# Patient Record
Sex: FEMALE | Race: WHITE | NOT HISPANIC OR LATINO | URBAN - METROPOLITAN AREA
[De-identification: names, ages, dates, MRNs, and addresses within clinical notes are randomized per-mention and may not be internally consistent; named-entity substitution may affect disease eponyms.]

---

## 2017-01-01 ENCOUNTER — INPATIENT (INPATIENT)
Facility: HOSPITAL | Age: 82
LOS: 2 days | Discharge: NURSING, REHAB OR LONG-TERM CARE | DRG: 682 | End: 2017-01-04
Attending: FAMILY MEDICINE | Admitting: INTERNAL MEDICINE
Payer: MEDICARE

## 2017-01-01 DIAGNOSIS — N17.9 ACUTE KIDNEY FAILURE, UNSPECIFIED: ICD-10-CM

## 2017-01-01 DIAGNOSIS — E86.0 DEHYDRATION: ICD-10-CM

## 2017-01-01 DIAGNOSIS — Z87.891 PERSONAL HISTORY OF NICOTINE DEPENDENCE: ICD-10-CM

## 2017-01-01 DIAGNOSIS — K21.9 GASTRO-ESOPHAGEAL REFLUX DISEASE WITHOUT ESOPHAGITIS: ICD-10-CM

## 2017-01-01 DIAGNOSIS — J15.9 UNSPECIFIED BACTERIAL PNEUMONIA: ICD-10-CM

## 2017-01-01 DIAGNOSIS — Z85.028 PERSONAL HISTORY OF OTHER MALIGNANT NEOPLASM OF STOMACH: ICD-10-CM

## 2017-01-01 DIAGNOSIS — N18.9 CHRONIC KIDNEY DISEASE, UNSPECIFIED: ICD-10-CM

## 2017-01-01 DIAGNOSIS — J18.9 PNEUMONIA, UNSPECIFIED ORGANISM: ICD-10-CM

## 2017-01-01 PROCEDURE — 71020: CPT | Mod: 26

## 2017-01-01 PROCEDURE — 93010 ELECTROCARDIOGRAM REPORT: CPT

## 2017-01-01 PROCEDURE — 99223 1ST HOSP IP/OBS HIGH 75: CPT

## 2017-01-02 PROCEDURE — 99233 SBSQ HOSP IP/OBS HIGH 50: CPT

## 2017-01-03 PROCEDURE — 99232 SBSQ HOSP IP/OBS MODERATE 35: CPT

## 2017-01-04 PROCEDURE — 87040 BLOOD CULTURE FOR BACTERIA: CPT

## 2017-01-04 PROCEDURE — 97001: CPT

## 2017-01-04 PROCEDURE — 94640 AIRWAY INHALATION TREATMENT: CPT

## 2017-01-04 PROCEDURE — 96365 THER/PROPH/DIAG IV INF INIT: CPT

## 2017-01-04 PROCEDURE — 97162 PT EVAL MOD COMPLEX 30 MIN: CPT

## 2017-01-04 PROCEDURE — 99285 EMERGENCY DEPT VISIT HI MDM: CPT | Mod: 25

## 2017-01-04 PROCEDURE — 85027 COMPLETE CBC AUTOMATED: CPT

## 2017-01-04 PROCEDURE — 87798 DETECT AGENT NOS DNA AMP: CPT

## 2017-01-04 PROCEDURE — 96366 THER/PROPH/DIAG IV INF ADDON: CPT

## 2017-01-04 PROCEDURE — 87400 INFLUENZA A/B EACH AG IA: CPT

## 2017-01-04 PROCEDURE — 87633 RESP VIRUS 12-25 TARGETS: CPT

## 2017-01-04 PROCEDURE — 83880 ASSAY OF NATRIURETIC PEPTIDE: CPT

## 2017-01-04 PROCEDURE — 85025 COMPLETE CBC W/AUTO DIFF WBC: CPT

## 2017-01-04 PROCEDURE — 85610 PROTHROMBIN TIME: CPT

## 2017-01-04 PROCEDURE — 93005 ELECTROCARDIOGRAM TRACING: CPT

## 2017-01-04 PROCEDURE — 96367 TX/PROPH/DG ADDL SEQ IV INF: CPT

## 2017-01-04 PROCEDURE — 80053 COMPREHEN METABOLIC PANEL: CPT

## 2017-01-04 PROCEDURE — 80076 HEPATIC FUNCTION PANEL: CPT

## 2017-01-04 PROCEDURE — 83605 ASSAY OF LACTIC ACID: CPT

## 2017-01-04 PROCEDURE — 87581 M.PNEUMON DNA AMP PROBE: CPT

## 2017-01-04 PROCEDURE — 71046 X-RAY EXAM CHEST 2 VIEWS: CPT

## 2017-01-04 PROCEDURE — 83735 ASSAY OF MAGNESIUM: CPT

## 2017-01-04 PROCEDURE — 87486 CHLMYD PNEUM DNA AMP PROBE: CPT

## 2017-01-04 PROCEDURE — 84132 ASSAY OF SERUM POTASSIUM: CPT

## 2017-01-04 PROCEDURE — 80048 BASIC METABOLIC PNL TOTAL CA: CPT

## 2017-01-04 PROCEDURE — 80069 RENAL FUNCTION PANEL: CPT

## 2017-01-04 PROCEDURE — 99232 SBSQ HOSP IP/OBS MODERATE 35: CPT

## 2017-01-04 PROCEDURE — 85730 THROMBOPLASTIN TIME PARTIAL: CPT

## 2017-01-09 ENCOUNTER — APPOINTMENT (OUTPATIENT)
Dept: FAMILY MEDICINE | Facility: CLINIC | Age: 82
End: 2017-01-09

## 2017-03-28 DIAGNOSIS — R93.8 ABNORMAL FINDINGS ON DIAGNOSTIC IMAGING OF OTHER SPECIFIED BODY STRUCTURES: ICD-10-CM

## 2017-03-30 ENCOUNTER — FORM ENCOUNTER (OUTPATIENT)
Age: 82
End: 2017-03-30

## 2017-03-31 ENCOUNTER — OUTPATIENT (OUTPATIENT)
Dept: OUTPATIENT SERVICES | Facility: HOSPITAL | Age: 82
LOS: 1 days | End: 2017-03-31
Payer: MEDICARE

## 2017-03-31 ENCOUNTER — APPOINTMENT (OUTPATIENT)
Dept: CT IMAGING | Facility: HOSPITAL | Age: 82
End: 2017-03-31

## 2017-03-31 DIAGNOSIS — J47.9 BRONCHIECTASIS, UNCOMPLICATED: ICD-10-CM

## 2017-03-31 DIAGNOSIS — J98.11 ATELECTASIS: ICD-10-CM

## 2017-03-31 DIAGNOSIS — J43.9 EMPHYSEMA, UNSPECIFIED: ICD-10-CM

## 2017-03-31 PROCEDURE — 71250 CT THORAX DX C-: CPT

## 2017-04-04 ENCOUNTER — CHART COPY (OUTPATIENT)
Age: 82
End: 2017-04-04

## 2017-04-04 RX ORDER — ACETAMINOPHEN 500 MG/1
500 TABLET, COATED ORAL 3 TIMES DAILY
Qty: 50 | Refills: 3 | Status: ACTIVE | COMMUNITY
Start: 2017-04-04 | End: 1900-01-01

## 2017-04-10 ENCOUNTER — APPOINTMENT (OUTPATIENT)
Dept: FAMILY MEDICINE | Facility: CLINIC | Age: 82
End: 2017-04-10

## 2017-04-10 VITALS
BODY MASS INDEX: 21.14 KG/M2 | HEIGHT: 57 IN | OXYGEN SATURATION: 95 % | RESPIRATION RATE: 15 BRPM | WEIGHT: 98 LBS | DIASTOLIC BLOOD PRESSURE: 72 MMHG | HEART RATE: 73 BPM | SYSTOLIC BLOOD PRESSURE: 100 MMHG

## 2017-04-10 DIAGNOSIS — S09.90XA UNSPECIFIED INJURY OF HEAD, INITIAL ENCOUNTER: ICD-10-CM

## 2017-04-10 DIAGNOSIS — R93.8 ABNORMAL FINDINGS ON DIAGNOSTIC IMAGING OF OTHER SPECIFIED BODY STRUCTURES: ICD-10-CM

## 2017-04-10 RX ORDER — CYANOCOBALAMIN 1000 UG/ML
1000 INJECTION INTRAMUSCULAR; SUBCUTANEOUS
Qty: 0 | Refills: 0 | Status: COMPLETED | OUTPATIENT
Start: 2017-04-10

## 2017-04-10 RX ADMIN — CYANOCOBALAMIN 0 MCG/ML: 1000 INJECTION INTRAMUSCULAR; SUBCUTANEOUS at 00:00

## 2017-05-04 ENCOUNTER — RX RENEWAL (OUTPATIENT)
Age: 82
End: 2017-05-04

## 2017-06-02 ENCOUNTER — MEDICATION RENEWAL (OUTPATIENT)
Age: 82
End: 2017-06-02

## 2017-06-02 ENCOUNTER — RX RENEWAL (OUTPATIENT)
Age: 82
End: 2017-06-02

## 2017-07-10 ENCOUNTER — APPOINTMENT (OUTPATIENT)
Dept: FAMILY MEDICINE | Facility: CLINIC | Age: 82
End: 2017-07-10

## 2017-07-12 VITALS
DIASTOLIC BLOOD PRESSURE: 64 MMHG | HEART RATE: 78 BPM | TEMPERATURE: 98.4 F | RESPIRATION RATE: 20 BRPM | SYSTOLIC BLOOD PRESSURE: 100 MMHG

## 2017-07-13 ENCOUNTER — FORM ENCOUNTER (OUTPATIENT)
Age: 82
End: 2017-07-13

## 2017-07-14 ENCOUNTER — APPOINTMENT (OUTPATIENT)
Dept: CT IMAGING | Facility: HOSPITAL | Age: 82
End: 2017-07-14

## 2017-07-14 ENCOUNTER — OUTPATIENT (OUTPATIENT)
Dept: OUTPATIENT SERVICES | Facility: HOSPITAL | Age: 82
LOS: 1 days | End: 2017-07-14
Payer: MEDICARE

## 2017-07-14 DIAGNOSIS — Z85.028 PERSONAL HISTORY OF OTHER MALIGNANT NEOPLASM OF STOMACH: ICD-10-CM

## 2017-07-14 DIAGNOSIS — N32.89 OTHER SPECIFIED DISORDERS OF BLADDER: ICD-10-CM

## 2017-07-14 PROCEDURE — 74176 CT ABD & PELVIS W/O CONTRAST: CPT

## 2017-07-25 ENCOUNTER — OTHER (OUTPATIENT)
Age: 82
End: 2017-07-25

## 2017-08-06 ENCOUNTER — FORM ENCOUNTER (OUTPATIENT)
Age: 82
End: 2017-08-06

## 2017-08-07 ENCOUNTER — OUTPATIENT (OUTPATIENT)
Dept: OUTPATIENT SERVICES | Facility: HOSPITAL | Age: 82
LOS: 1 days | End: 2017-08-07
Payer: MEDICARE

## 2017-08-07 ENCOUNTER — APPOINTMENT (OUTPATIENT)
Dept: ULTRASOUND IMAGING | Facility: HOSPITAL | Age: 82
End: 2017-08-07
Payer: MEDICARE

## 2017-08-07 DIAGNOSIS — Z85.028 PERSONAL HISTORY OF OTHER MALIGNANT NEOPLASM OF STOMACH: ICD-10-CM

## 2017-08-07 DIAGNOSIS — K76.0 FATTY (CHANGE OF) LIVER, NOT ELSEWHERE CLASSIFIED: ICD-10-CM

## 2017-08-07 DIAGNOSIS — K83.8 OTHER SPECIFIED DISEASES OF BILIARY TRACT: ICD-10-CM

## 2017-08-07 DIAGNOSIS — Z90.49 ACQUIRED ABSENCE OF OTHER SPECIFIED PARTS OF DIGESTIVE TRACT: ICD-10-CM

## 2017-08-07 PROCEDURE — 76700 US EXAM ABDOM COMPLETE: CPT | Mod: 26

## 2017-08-07 PROCEDURE — 76700 US EXAM ABDOM COMPLETE: CPT

## 2017-10-09 ENCOUNTER — APPOINTMENT (OUTPATIENT)
Dept: FAMILY MEDICINE | Facility: CLINIC | Age: 82
End: 2017-10-09

## 2017-10-09 ENCOUNTER — INPATIENT (INPATIENT)
Facility: HOSPITAL | Age: 82
LOS: 3 days | Discharge: HOME | DRG: 153 | End: 2017-10-13
Attending: HOSPITALIST | Admitting: FAMILY MEDICINE
Payer: MEDICARE

## 2017-10-09 DIAGNOSIS — Z85.028 PERSONAL HISTORY OF OTHER MALIGNANT NEOPLASM OF STOMACH: ICD-10-CM

## 2017-10-09 DIAGNOSIS — K21.9 GASTRO-ESOPHAGEAL REFLUX DISEASE WITHOUT ESOPHAGITIS: ICD-10-CM

## 2017-10-09 DIAGNOSIS — E87.0 HYPEROSMOLALITY AND HYPERNATREMIA: ICD-10-CM

## 2017-10-09 DIAGNOSIS — N18.3 CHRONIC KIDNEY DISEASE, STAGE 3 (MODERATE): ICD-10-CM

## 2017-10-09 DIAGNOSIS — G62.9 POLYNEUROPATHY, UNSPECIFIED: ICD-10-CM

## 2017-10-09 DIAGNOSIS — D69.6 THROMBOCYTOPENIA, UNSPECIFIED: ICD-10-CM

## 2017-10-09 DIAGNOSIS — G47.00 INSOMNIA, UNSPECIFIED: ICD-10-CM

## 2017-10-09 DIAGNOSIS — N17.9 ACUTE KIDNEY FAILURE, UNSPECIFIED: ICD-10-CM

## 2017-10-09 DIAGNOSIS — J06.9 ACUTE UPPER RESPIRATORY INFECTION, UNSPECIFIED: ICD-10-CM

## 2017-10-09 DIAGNOSIS — R50.9 FEVER, UNSPECIFIED: ICD-10-CM

## 2017-10-09 PROCEDURE — 71010: CPT | Mod: 26

## 2017-10-09 PROCEDURE — 93010 ELECTROCARDIOGRAM REPORT: CPT

## 2017-10-09 PROCEDURE — 99223 1ST HOSP IP/OBS HIGH 75: CPT | Mod: AI

## 2017-10-10 PROCEDURE — 99233 SBSQ HOSP IP/OBS HIGH 50: CPT

## 2017-10-11 PROCEDURE — 99233 SBSQ HOSP IP/OBS HIGH 50: CPT

## 2017-10-12 PROCEDURE — 99233 SBSQ HOSP IP/OBS HIGH 50: CPT

## 2017-10-13 ENCOUNTER — MEDICATION RENEWAL (OUTPATIENT)
Age: 82
End: 2017-10-13

## 2017-10-13 PROCEDURE — 99233 SBSQ HOSP IP/OBS HIGH 50: CPT

## 2017-11-10 ENCOUNTER — APPOINTMENT (OUTPATIENT)
Dept: FAMILY MEDICINE | Facility: CLINIC | Age: 82
End: 2017-11-10
Payer: MEDICARE

## 2017-11-10 VITALS — RESPIRATION RATE: 16 BRPM | OXYGEN SATURATION: 96 %

## 2017-11-10 VITALS
DIASTOLIC BLOOD PRESSURE: 80 MMHG | HEART RATE: 74 BPM | SYSTOLIC BLOOD PRESSURE: 112 MMHG | HEIGHT: 57 IN | WEIGHT: 107 LBS | OXYGEN SATURATION: 95 % | BODY MASS INDEX: 23.08 KG/M2

## 2017-11-10 DIAGNOSIS — G25.81 RESTLESS LEGS SYNDROME: ICD-10-CM

## 2017-11-10 PROCEDURE — 99214 OFFICE O/P EST MOD 30 MIN: CPT

## 2017-12-10 ENCOUNTER — INPATIENT (INPATIENT)
Facility: HOSPITAL | Age: 82
LOS: 0 days | Discharge: HOME | DRG: 563 | End: 2017-12-11
Attending: HOSPITALIST | Admitting: INTERNAL MEDICINE
Payer: COMMERCIAL

## 2017-12-10 DIAGNOSIS — N18.4 CHRONIC KIDNEY DISEASE, STAGE 4 (SEVERE): ICD-10-CM
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
## 2017-12-10 DIAGNOSIS — L03.90 CELLULITIS, UNSPECIFIED: ICD-10-CM

## 2017-12-10 DIAGNOSIS — S42.001A FRACTURE OF UNSPECIFIED PART OF RIGHT CLAVICLE, INITIAL ENCOUNTER FOR CLOSED FRACTURE: ICD-10-CM
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
## 2017-12-10 DIAGNOSIS — K21.9 GASTRO-ESOPHAGEAL REFLUX DISEASE WITHOUT ESOPHAGITIS: ICD-10-CM
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
## 2017-12-10 DIAGNOSIS — G25.81 RESTLESS LEGS SYNDROME: ICD-10-CM

## 2017-12-10 DIAGNOSIS — D69.6 THROMBOCYTOPENIA, UNSPECIFIED: ICD-10-CM

## 2017-12-10 DIAGNOSIS — Z85.00 PERSONAL HISTORY OF MALIGNANT NEOPLASM OF UNSPECIFIED DIGESTIVE ORGAN: ICD-10-CM

## 2017-12-10 PROCEDURE — 93010 ELECTROCARDIOGRAM REPORT: CPT

## 2017-12-10 PROCEDURE — 73030 X-RAY EXAM OF SHOULDER: CPT | Mod: 26,RT

## 2017-12-10 PROCEDURE — 71010: CPT | Mod: 26

## 2017-12-11 PROCEDURE — 99239 HOSP IP/OBS DSCHRG MGMT >30: CPT

## 2017-12-15 ENCOUNTER — APPOINTMENT (OUTPATIENT)
Dept: FAMILY MEDICINE | Facility: CLINIC | Age: 82
End: 2017-12-15
Payer: MEDICARE

## 2017-12-15 VITALS
HEIGHT: 57 IN | BODY MASS INDEX: 23.08 KG/M2 | DIASTOLIC BLOOD PRESSURE: 78 MMHG | HEART RATE: 81 BPM | WEIGHT: 107 LBS | OXYGEN SATURATION: 86 % | SYSTOLIC BLOOD PRESSURE: 120 MMHG

## 2017-12-15 VITALS — RESPIRATION RATE: 14 BRPM

## 2017-12-15 PROCEDURE — 99214 OFFICE O/P EST MOD 30 MIN: CPT

## 2017-12-19 PROCEDURE — 80076 HEPATIC FUNCTION PANEL: CPT

## 2017-12-19 PROCEDURE — 87581 M.PNEUMON DNA AMP PROBE: CPT

## 2017-12-19 PROCEDURE — 85610 PROTHROMBIN TIME: CPT

## 2017-12-19 PROCEDURE — 87086 URINE CULTURE/COLONY COUNT: CPT

## 2017-12-19 PROCEDURE — 83605 ASSAY OF LACTIC ACID: CPT

## 2017-12-19 PROCEDURE — 71045 X-RAY EXAM CHEST 1 VIEW: CPT

## 2017-12-19 PROCEDURE — 97161 PT EVAL LOW COMPLEX 20 MIN: CPT

## 2017-12-19 PROCEDURE — 85730 THROMBOPLASTIN TIME PARTIAL: CPT

## 2017-12-19 PROCEDURE — 87633 RESP VIRUS 12-25 TARGETS: CPT

## 2017-12-19 PROCEDURE — G8980: CPT

## 2017-12-19 PROCEDURE — 96365 THER/PROPH/DIAG IV INF INIT: CPT

## 2017-12-19 PROCEDURE — 80053 COMPREHEN METABOLIC PANEL: CPT

## 2017-12-19 PROCEDURE — G8978: CPT

## 2017-12-19 PROCEDURE — 97116 GAIT TRAINING THERAPY: CPT

## 2017-12-19 PROCEDURE — 85025 COMPLETE CBC W/AUTO DIFF WBC: CPT

## 2017-12-19 PROCEDURE — 87040 BLOOD CULTURE FOR BACTERIA: CPT

## 2017-12-19 PROCEDURE — G8979: CPT

## 2017-12-19 PROCEDURE — 96366 THER/PROPH/DIAG IV INF ADDON: CPT

## 2017-12-19 PROCEDURE — 85027 COMPLETE CBC AUTOMATED: CPT

## 2017-12-19 PROCEDURE — 80048 BASIC METABOLIC PNL TOTAL CA: CPT

## 2017-12-19 PROCEDURE — 93005 ELECTROCARDIOGRAM TRACING: CPT

## 2017-12-19 PROCEDURE — 81003 URINALYSIS AUTO W/O SCOPE: CPT

## 2017-12-19 PROCEDURE — 73030 X-RAY EXAM OF SHOULDER: CPT

## 2017-12-19 PROCEDURE — 87486 CHLMYD PNEUM DNA AMP PROBE: CPT

## 2017-12-19 PROCEDURE — 99285 EMERGENCY DEPT VISIT HI MDM: CPT | Mod: 25

## 2017-12-19 PROCEDURE — 96367 TX/PROPH/DG ADDL SEQ IV INF: CPT

## 2017-12-19 PROCEDURE — 87400 INFLUENZA A/B EACH AG IA: CPT

## 2017-12-19 PROCEDURE — 96375 TX/PRO/DX INJ NEW DRUG ADDON: CPT

## 2017-12-19 PROCEDURE — 87798 DETECT AGENT NOS DNA AMP: CPT

## 2017-12-29 ENCOUNTER — APPOINTMENT (OUTPATIENT)
Dept: FAMILY MEDICINE | Facility: CLINIC | Age: 82
End: 2017-12-29
Payer: MEDICARE

## 2017-12-29 VITALS
TEMPERATURE: 98.4 F | RESPIRATION RATE: 14 BRPM | OXYGEN SATURATION: 97 % | BODY MASS INDEX: 23.08 KG/M2 | WEIGHT: 107 LBS | HEART RATE: 80 BPM | SYSTOLIC BLOOD PRESSURE: 128 MMHG | DIASTOLIC BLOOD PRESSURE: 72 MMHG | HEIGHT: 57 IN

## 2017-12-29 DIAGNOSIS — S81.819A LACERATION W/OUT FOREIGN BODY, UNSPECIFIED LOWER LEG, INITIAL ENCOUNTER: ICD-10-CM

## 2017-12-29 DIAGNOSIS — S42.021A DISPLACED FRACTURE OF SHAFT OF RIGHT CLAVICLE, INITIAL ENCOUNTER FOR CLOSED FRACTURE: ICD-10-CM

## 2017-12-29 PROCEDURE — 99214 OFFICE O/P EST MOD 30 MIN: CPT

## 2018-01-26 ENCOUNTER — APPOINTMENT (OUTPATIENT)
Dept: FAMILY MEDICINE | Facility: CLINIC | Age: 83
End: 2018-01-26

## 2018-05-07 ENCOUNTER — RX RENEWAL (OUTPATIENT)
Age: 83
End: 2018-05-07

## 2018-05-07 ENCOUNTER — APPOINTMENT (OUTPATIENT)
Dept: FAMILY MEDICINE | Facility: CLINIC | Age: 83
End: 2018-05-07
Payer: MEDICARE

## 2018-05-07 VITALS
HEART RATE: 67 BPM | OXYGEN SATURATION: 95 % | DIASTOLIC BLOOD PRESSURE: 80 MMHG | BODY MASS INDEX: 23.3 KG/M2 | HEIGHT: 57 IN | TEMPERATURE: 97.9 F | WEIGHT: 108 LBS | SYSTOLIC BLOOD PRESSURE: 110 MMHG

## 2018-05-07 VITALS — RESPIRATION RATE: 14 BRPM

## 2018-05-07 DIAGNOSIS — Z86.2 PERSONAL HISTORY OF DISEASES OF THE BLOOD AND BLOOD-FORMING ORGANS AND CERTAIN DISORDERS INVOLVING THE IMMUNE MECHANISM: ICD-10-CM

## 2018-05-07 PROCEDURE — 99213 OFFICE O/P EST LOW 20 MIN: CPT | Mod: 25

## 2018-05-07 PROCEDURE — 36415 COLL VENOUS BLD VENIPUNCTURE: CPT

## 2018-05-08 LAB
ALBUMIN SERPL ELPH-MCNC: 4.2 G/DL
ALP BLD-CCNC: 118 U/L
ALT SERPL-CCNC: 29 U/L
ANION GAP SERPL CALC-SCNC: 13 MMOL/L
AST SERPL-CCNC: 43 U/L
BASOPHILS # BLD AUTO: 0.02 K/UL
BASOPHILS NFR BLD AUTO: 0.4 %
BILIRUB SERPL-MCNC: 0.4 MG/DL
BUN SERPL-MCNC: 42 MG/DL
CALCIUM SERPL-MCNC: 9.3 MG/DL
CHLORIDE SERPL-SCNC: 110 MMOL/L
CHOLEST SERPL-MCNC: 195 MG/DL
CHOLEST/HDLC SERPL: 2.9 RATIO
CO2 SERPL-SCNC: 22 MMOL/L
CREAT SERPL-MCNC: 1.98 MG/DL
EOSINOPHIL # BLD AUTO: 0.11 K/UL
EOSINOPHIL NFR BLD AUTO: 2.2 %
FERRITIN SERPL-MCNC: 45 NG/ML
FOLATE SERPL-MCNC: 11.5 NG/ML
GLUCOSE SERPL-MCNC: 88 MG/DL
HBA1C MFR BLD HPLC: 5.8 %
HCT VFR BLD CALC: 40.4 %
HDLC SERPL-MCNC: 68 MG/DL
HGB BLD-MCNC: 12.2 G/DL
IMM GRANULOCYTES NFR BLD AUTO: 0 %
IRON SATN MFR SERPL: 19 %
IRON SERPL-MCNC: 59 UG/DL
LDLC SERPL CALC-MCNC: 109 MG/DL
LYMPHOCYTES # BLD AUTO: 1.11 K/UL
LYMPHOCYTES NFR BLD AUTO: 22.2 %
MAN DIFF?: NORMAL
MCHC RBC-ENTMCNC: 30.2 GM/DL
MCHC RBC-ENTMCNC: 30.3 PG
MCV RBC AUTO: 100.5 FL
MONOCYTES # BLD AUTO: 0.4 K/UL
MONOCYTES NFR BLD AUTO: 8 %
NEUTROPHILS # BLD AUTO: 3.35 K/UL
NEUTROPHILS NFR BLD AUTO: 67.2 %
PLATELET # BLD AUTO: 168 K/UL
POTASSIUM SERPL-SCNC: 5 MMOL/L
PROT SERPL-MCNC: 7.8 G/DL
RBC # BLD: 4.02 M/UL
RBC # FLD: 14.6 %
SODIUM SERPL-SCNC: 145 MMOL/L
T4 FREE SERPL-MCNC: 1.2 NG/DL
TIBC SERPL-MCNC: 310 UG/DL
TRIGL SERPL-MCNC: 90 MG/DL
TSH SERPL-ACNC: 3.4 UIU/ML
UIBC SERPL-MCNC: 251 UG/DL
VIT B12 SERPL-MCNC: 696 PG/ML
WBC # FLD AUTO: 4.99 K/UL

## 2018-06-08 ENCOUNTER — RX RENEWAL (OUTPATIENT)
Age: 83
End: 2018-06-08

## 2018-06-08 ENCOUNTER — MEDICATION RENEWAL (OUTPATIENT)
Age: 83
End: 2018-06-08

## 2018-08-06 ENCOUNTER — APPOINTMENT (OUTPATIENT)
Dept: FAMILY MEDICINE | Facility: CLINIC | Age: 83
End: 2018-08-06
Payer: MEDICARE

## 2018-08-06 VITALS
TEMPERATURE: 97.8 F | HEIGHT: 57 IN | BODY MASS INDEX: 22.87 KG/M2 | SYSTOLIC BLOOD PRESSURE: 120 MMHG | OXYGEN SATURATION: 96 % | HEART RATE: 69 BPM | WEIGHT: 106 LBS | DIASTOLIC BLOOD PRESSURE: 80 MMHG

## 2018-08-06 VITALS — RESPIRATION RATE: 14 BRPM

## 2018-08-06 DIAGNOSIS — E53.8 DEFICIENCY OF OTHER SPECIFIED B GROUP VITAMINS: ICD-10-CM

## 2018-08-06 PROCEDURE — 99213 OFFICE O/P EST LOW 20 MIN: CPT

## 2018-08-06 NOTE — PHYSICAL EXAM
[No Acute Distress] : no acute distress [Well Nourished] : well nourished [Well Developed] : well developed [Well-Appearing] : well-appearing [Normal Sclera/Conjunctiva] : normal sclera/conjunctiva [PERRL] : pupils equal round and reactive to light [EOMI] : extraocular movements intact [Normal Outer Ear/Nose] : the outer ears and nose were normal in appearance [Normal Oropharynx] : the oropharynx was normal [No JVD] : no jugular venous distention [Supple] : supple [No Lymphadenopathy] : no lymphadenopathy [Thyroid Normal, No Nodules] : the thyroid was normal and there were no nodules present [No Respiratory Distress] : no respiratory distress  [Clear to Auscultation] : lungs were clear to auscultation bilaterally [No Accessory Muscle Use] : no accessory muscle use [Normal Rate] : normal rate  [Regular Rhythm] : with a regular rhythm [Normal S1, S2] : normal S1 and S2 [No Carotid Bruits] : no carotid bruits [No Abdominal Bruit] : a ~M bruit was not heard ~T in the abdomen [No Varicosities] : no varicosities [Pedal Pulses Present] : the pedal pulses are present [No Edema] : there was no peripheral edema [No Extremity Clubbing/Cyanosis] : no extremity clubbing/cyanosis [No Palpable Aorta] : no palpable aorta [Soft] : abdomen soft [Non Tender] : non-tender [Non-distended] : non-distended [No Masses] : no abdominal mass palpated [No HSM] : no HSM [Normal Bowel Sounds] : normal bowel sounds [Normal Posterior Cervical Nodes] : no posterior cervical lymphadenopathy [Normal Anterior Cervical Nodes] : no anterior cervical lymphadenopathy [No CVA Tenderness] : no CVA  tenderness [No Spinal Tenderness] : no spinal tenderness [No Joint Swelling] : no joint swelling [No Rash] : no rash [Coordination Grossly Intact] : coordination grossly intact [No Focal Deficits] : no focal deficits [Deep Tendon Reflexes (DTR)] : deep tendon reflexes were 2+ and symmetric [Speech Grossly Normal] : speech grossly normal [Memory Grossly Normal] : memory grossly normal [Normal Affect] : the affect was normal [Alert and Oriented x3] : oriented to person, place, and time [Normal Mood] : the mood was normal [Normal Insight/Judgement] : insight and judgment were intact [de-identified] : Systolic murmur 3/6 [de-identified] : Status post laceration and skin care. Right lower extremity healed well [de-identified] : Gait unsteady, but patient does well with cane

## 2018-08-06 NOTE — HISTORY OF PRESENT ILLNESS
[FreeTextEntry1] : Please see history of present illness [de-identified] : Patient is a 89-year-old female, who presents today for followup appointment. Patient states that she is in her usual state of health. She denies any chest pain, shortness of breath, nausea, or vomiting. In fact, she states she is feeling good.\par \par Medical history is significant for anemia, chronic renal insufficiency, hypertension, osteoarthritis, and history of vitamin B12 deficiency.\par \par Patient is awake, alert, and oriented x3 in no acute distress. She is calm, cooperative, well-groomed, and nourished

## 2018-08-06 NOTE — ASSESSMENT
[FreeTextEntry1] : We will continue present medical management without change. Patient is doing well. She prefers conservative management .Patient's comprehensive physical exam was stable. No acute changes as stated earlier. Continue present medical management without change

## 2018-08-06 NOTE — HEALTH RISK ASSESSMENT
[Patient declined mammogram] : Patient declined mammogram [Patient declined PAP Smear] : Patient declined PAP Smear [Patient declined bone density test] : Patient declined bone density test [Patient declined colonoscopy] : Patient declined colonoscopy [HIV test declined] : HIV test declined [Hepatitis C test offered] : Hepatitis C test offered [Fully functional (bathing, dressing, toileting, transferring, walking, feeding)] : Fully functional (bathing, dressing, toileting, transferring, walking, feeding) [Fully functional (using the telephone, shopping, preparing meals, housekeeping, doing laundry, using] : Fully functional and needs no help or supervision to perform IADLs (using the telephone, shopping, preparing meals, housekeeping, doing laundry, using transportation, managing medications and managing finances) [Discussed at today's visit] : Advance Directives Discussed at today's visit [Patient's preferences updated] : Patient's preferences updated  [Designated Healthcare Proxy] : Designated healthcare proxy [Name: ___] : Health Care Proxy's Name: [unfilled]  [Relationship: ___] : Relationship: [unfilled] [Aggressive treatment] : aggressive treatment [] : No [Any fall with injury in past year] : Patient reported fall with injury in the past year [0] : 2) Feeling down, depressed, or hopeless: Not at all (0) [[id number]] : 0

## 2018-08-06 NOTE — REVIEW OF SYSTEMS
[Joint Pain] : joint pain [Joint Stiffness] : no joint stiffness [Joint Swelling] : no joint swelling [Muscle Weakness] : no muscle weakness [Muscle Pain] : no muscle pain [Back Pain] : no back pain [Negative] : Heme/Lymph

## 2018-10-05 ENCOUNTER — MEDICATION RENEWAL (OUTPATIENT)
Age: 83
End: 2018-10-05

## 2018-10-05 ENCOUNTER — RX RENEWAL (OUTPATIENT)
Age: 83
End: 2018-10-05

## 2018-10-05 RX ORDER — PANTOPRAZOLE 40 MG/1
40 TABLET, DELAYED RELEASE ORAL
Qty: 30 | Refills: 3 | Status: ACTIVE | COMMUNITY
Start: 2018-10-05 | End: 1900-01-01

## 2018-11-02 ENCOUNTER — APPOINTMENT (OUTPATIENT)
Dept: FAMILY MEDICINE | Facility: CLINIC | Age: 83
End: 2018-11-02
Payer: MEDICARE

## 2018-11-02 VITALS
OXYGEN SATURATION: 78 % | BODY MASS INDEX: 22.87 KG/M2 | DIASTOLIC BLOOD PRESSURE: 80 MMHG | TEMPERATURE: 98 F | SYSTOLIC BLOOD PRESSURE: 140 MMHG | HEART RATE: 109 BPM | HEIGHT: 57 IN | WEIGHT: 106 LBS

## 2018-11-02 VITALS — RESPIRATION RATE: 14 BRPM

## 2018-11-02 DIAGNOSIS — C16.9 MALIGNANT NEOPLASM OF STOMACH, UNSPECIFIED: ICD-10-CM

## 2018-11-02 DIAGNOSIS — M19.90 UNSPECIFIED OSTEOARTHRITIS, UNSPECIFIED SITE: ICD-10-CM

## 2018-11-02 DIAGNOSIS — I10 ESSENTIAL (PRIMARY) HYPERTENSION: ICD-10-CM

## 2018-11-02 DIAGNOSIS — K21.9 GASTRO-ESOPHAGEAL REFLUX DISEASE W/OUT ESOPHAGITIS: ICD-10-CM

## 2018-11-02 PROCEDURE — 99213 OFFICE O/P EST LOW 20 MIN: CPT | Mod: 25

## 2018-11-02 PROCEDURE — G0008: CPT

## 2018-11-02 PROCEDURE — 90686 IIV4 VACC NO PRSV 0.5 ML IM: CPT

## 2018-11-02 NOTE — HISTORY OF PRESENT ILLNESS
[FreeTextEntry1] : Please see history of present illness [de-identified] : Patient is a 89-year-old female, who presents today for followup appointment. Patient states that she is in a usual state of health without complaints. Medical history is significant for gastroesophageal reflux, hypertension, history of malignant neoplasm of stomach patient is in remission and doing well. She does have a history of osteoarthritis, which is well-controlled. Patient denies any chest pain, shortness of breath, nausea, or vomiting. States, that she's feeling well.

## 2018-11-02 NOTE — REVIEW OF SYSTEMS
[Joint Pain] : joint pain [Joint Stiffness] : no joint stiffness [Joint Swelling] : no joint swelling [Muscle Weakness] : no muscle weakness [Muscle Pain] : no muscle pain [Back Pain] : no back pain [Unsteady Walking] : ataxia [Negative] : Heme/Lymph [FreeTextEntry9] : Joint pain is a chronic problem, but well controlled [de-identified] : Patient ambulates with cane and does well

## 2018-11-02 NOTE — PHYSICAL EXAM
[No Acute Distress] : no acute distress [Well Nourished] : well nourished [Well Developed] : well developed [Well-Appearing] : well-appearing [Normal Voice/Communication] : normal voice/communication [Normal Sclera/Conjunctiva] : normal sclera/conjunctiva [PERRL] : pupils equal round and reactive to light [EOMI] : extraocular movements intact [Normal Outer Ear/Nose] : the outer ears and nose were normal in appearance [Normal Oropharynx] : the oropharynx was normal [Normal TMs] : both tympanic membranes were normal [No JVD] : no jugular venous distention [Supple] : supple [No Lymphadenopathy] : no lymphadenopathy [Thyroid Normal, No Nodules] : the thyroid was normal and there were no nodules present [No Respiratory Distress] : no respiratory distress  [Clear to Auscultation] : lungs were clear to auscultation bilaterally [No Accessory Muscle Use] : no accessory muscle use [Normal Rate] : normal rate  [Regular Rhythm] : with a regular rhythm [Normal S1, S2] : normal S1 and S2 [No Carotid Bruits] : no carotid bruits [No Abdominal Bruit] : a ~M bruit was not heard ~T in the abdomen [No Varicosities] : no varicosities [Pedal Pulses Present] : the pedal pulses are present [No Edema] : there was no peripheral edema [No Extremity Clubbing/Cyanosis] : no extremity clubbing/cyanosis [No Palpable Aorta] : no palpable aorta [Soft] : abdomen soft [Non Tender] : non-tender [Non-distended] : non-distended [No Masses] : no abdominal mass palpated [No HSM] : no HSM [Normal Bowel Sounds] : normal bowel sounds [Normal Posterior Cervical Nodes] : no posterior cervical lymphadenopathy [Normal Anterior Cervical Nodes] : no anterior cervical lymphadenopathy [No CVA Tenderness] : no CVA  tenderness [No Spinal Tenderness] : no spinal tenderness [No Joint Swelling] : no joint swelling [No Rash] : no rash [Coordination Grossly Intact] : coordination grossly intact [No Focal Deficits] : no focal deficits [Deep Tendon Reflexes (DTR)] : deep tendon reflexes were 2+ and symmetric [Speech Grossly Normal] : speech grossly normal [Memory Grossly Normal] : memory grossly normal [Normal Affect] : the affect was normal [Alert and Oriented x3] : oriented to person, place, and time [Normal Mood] : the mood was normal [Normal Insight/Judgement] : insight and judgment were intact [de-identified] : Systolic murmur 3/6 no change stable [de-identified] : Status post laceration and skin care. Right lower extremity healed well [de-identified] : Gait unsteady, but patient does well with cane

## 2018-11-02 NOTE — ASSESSMENT
[FreeTextEntry1] : Assessment and plan:\par \par 1. Gastroesophageal reflux disease with history of stomach CA. Patient doing well tolerating proton pump inhibitor. No change in medications.\par \par 2. Anemia, stable. No change in present medical management. Patient has not required B12 injections for a while. Patient taking B12 supplementation orally at next visit. I will obtain comprehensive blood work.\par \par 3. Unsteady gait stable. Patient has had no falls. Ambulates well with cane.\par \par 4. Influenza vaccine administered.

## 2018-11-02 NOTE — HEALTH RISK ASSESSMENT
[] : No [No falls in past year] : Patient reported no falls in the past year [0] : 2) Feeling down, depressed, or hopeless: Not at all (0) [[id number]] : 0

## 2018-12-04 ENCOUNTER — RX RENEWAL (OUTPATIENT)
Age: 83
End: 2018-12-04

## 2018-12-04 RX ORDER — PRAMIPEXOLE DIHYDROCHLORIDE 0.25 MG/1
0.25 TABLET ORAL
Qty: 30 | Refills: 3 | Status: ACTIVE | COMMUNITY
Start: 2018-06-08 | End: 1900-01-01

## 2019-01-25 ENCOUNTER — INPATIENT (INPATIENT)
Facility: HOSPITAL | Age: 84
LOS: 2 days | Discharge: HOME | DRG: 308 | End: 2019-01-28
Attending: INTERNAL MEDICINE | Admitting: INTERNAL MEDICINE
Payer: MEDICARE

## 2019-01-25 ENCOUNTER — APPOINTMENT (OUTPATIENT)
Dept: FAMILY MEDICINE | Facility: CLINIC | Age: 84
End: 2019-01-25
Payer: MEDICARE

## 2019-01-25 VITALS
OXYGEN SATURATION: 99 % | WEIGHT: 120 LBS | HEIGHT: 57 IN | BODY MASS INDEX: 25.89 KG/M2 | RESPIRATION RATE: 18 BRPM | HEART RATE: 147 BPM | SYSTOLIC BLOOD PRESSURE: 108 MMHG | TEMPERATURE: 97.5 F | DIASTOLIC BLOOD PRESSURE: 80 MMHG

## 2019-01-25 VITALS — HEART RATE: 119 BPM

## 2019-01-25 VITALS
HEART RATE: 86 BPM | DIASTOLIC BLOOD PRESSURE: 89 MMHG | HEIGHT: 62 IN | SYSTOLIC BLOOD PRESSURE: 129 MMHG | OXYGEN SATURATION: 98 % | RESPIRATION RATE: 18 BRPM | WEIGHT: 121.92 LBS | TEMPERATURE: 98 F

## 2019-01-25 DIAGNOSIS — I48.91 UNSPECIFIED ATRIAL FIBRILLATION: ICD-10-CM

## 2019-01-25 DIAGNOSIS — I50.9 HEART FAILURE, UNSPECIFIED: ICD-10-CM

## 2019-01-25 DIAGNOSIS — N18.9 CHRONIC KIDNEY DISEASE, UNSPECIFIED: ICD-10-CM

## 2019-01-25 LAB
ALBUMIN SERPL ELPH-MCNC: 2.8 G/DL — LOW (ref 3.3–5)
ALP SERPL-CCNC: 149 U/L — HIGH (ref 40–120)
ALT FLD-CCNC: 42 U/L DA — SIGNIFICANT CHANGE UP (ref 10–45)
ANION GAP SERPL CALC-SCNC: 10 MMOL/L — SIGNIFICANT CHANGE UP (ref 5–17)
APTT BLD: 28.4 SEC — SIGNIFICANT CHANGE UP (ref 27.5–36.3)
AST SERPL-CCNC: 61 U/L — HIGH (ref 10–40)
BILIRUB SERPL-MCNC: 0.5 MG/DL — SIGNIFICANT CHANGE UP (ref 0.2–1.2)
BUN SERPL-MCNC: 54 MG/DL — HIGH (ref 7–23)
CALCIUM SERPL-MCNC: 8.6 MG/DL — SIGNIFICANT CHANGE UP (ref 8.4–10.5)
CHLORIDE SERPL-SCNC: 112 MMOL/L — HIGH (ref 96–108)
CO2 SERPL-SCNC: 20 MMOL/L — LOW (ref 22–31)
CREAT SERPL-MCNC: 2.69 MG/DL — HIGH (ref 0.5–1.3)
GLUCOSE SERPL-MCNC: 94 MG/DL — SIGNIFICANT CHANGE UP (ref 70–99)
HCT VFR BLD CALC: 31 % — LOW (ref 34.5–45)
HGB BLD-MCNC: 9.2 G/DL — LOW (ref 11.5–15.5)
INR BLD: 1.05 RATIO — SIGNIFICANT CHANGE UP (ref 0.88–1.16)
LACTATE SERPL-SCNC: 1 MMOL/L — SIGNIFICANT CHANGE UP (ref 0.7–2)
MCHC RBC-ENTMCNC: 28.1 PG — SIGNIFICANT CHANGE UP (ref 27–34)
MCHC RBC-ENTMCNC: 29.7 GM/DL — LOW (ref 32–36)
MCV RBC AUTO: 94.6 FL — SIGNIFICANT CHANGE UP (ref 80–100)
NT-PROBNP SERPL-SCNC: HIGH PG/ML (ref 0–300)
PLATELET # BLD AUTO: 173 K/UL — SIGNIFICANT CHANGE UP (ref 150–400)
POTASSIUM SERPL-MCNC: 5.1 MMOL/L — SIGNIFICANT CHANGE UP (ref 3.5–5.3)
POTASSIUM SERPL-MCNC: 5.5 MMOL/L — HIGH (ref 3.5–5.3)
POTASSIUM SERPL-SCNC: 5.1 MMOL/L — SIGNIFICANT CHANGE UP (ref 3.5–5.3)
POTASSIUM SERPL-SCNC: 5.5 MMOL/L — HIGH (ref 3.5–5.3)
PROT SERPL-MCNC: 7.2 G/DL — SIGNIFICANT CHANGE UP (ref 6–8.3)
PROTHROM AB SERPL-ACNC: 11.8 SEC — SIGNIFICANT CHANGE UP (ref 10–12.9)
RBC # BLD: 3.28 M/UL — LOW (ref 3.8–5.2)
RBC # FLD: 16.2 % — HIGH (ref 10.3–14.5)
SODIUM SERPL-SCNC: 142 MMOL/L — SIGNIFICANT CHANGE UP (ref 135–145)
TROPONIN I SERPL-MCNC: 0.02 NG/ML — SIGNIFICANT CHANGE UP (ref 0.02–0.06)
WBC # BLD: 5.7 K/UL — SIGNIFICANT CHANGE UP (ref 3.8–10.5)
WBC # FLD AUTO: 5.7 K/UL — SIGNIFICANT CHANGE UP (ref 3.8–10.5)

## 2019-01-25 PROCEDURE — 93970 EXTREMITY STUDY: CPT | Mod: 26

## 2019-01-25 PROCEDURE — 71045 X-RAY EXAM CHEST 1 VIEW: CPT | Mod: 26

## 2019-01-25 PROCEDURE — 99223 1ST HOSP IP/OBS HIGH 75: CPT

## 2019-01-25 PROCEDURE — 99215 OFFICE O/P EST HI 40 MIN: CPT | Mod: 25

## 2019-01-25 PROCEDURE — 99285 EMERGENCY DEPT VISIT HI MDM: CPT

## 2019-01-25 PROCEDURE — 93010 ELECTROCARDIOGRAM REPORT: CPT

## 2019-01-25 PROCEDURE — 93000 ELECTROCARDIOGRAM COMPLETE: CPT | Mod: 59

## 2019-01-25 RX ORDER — ASPIRIN/CALCIUM CARB/MAGNESIUM 324 MG
162 TABLET ORAL ONCE
Qty: 0 | Refills: 0 | Status: DISCONTINUED | OUTPATIENT
Start: 2019-01-25 | End: 2019-01-25

## 2019-01-25 RX ORDER — AZITHROMYCIN 500 MG/1
500 TABLET, FILM COATED ORAL ONCE
Qty: 0 | Refills: 0 | Status: COMPLETED | OUTPATIENT
Start: 2019-01-25 | End: 2019-01-25

## 2019-01-25 RX ORDER — CEFTRIAXONE 500 MG/1
1 INJECTION, POWDER, FOR SOLUTION INTRAMUSCULAR; INTRAVENOUS ONCE
Qty: 0 | Refills: 0 | Status: COMPLETED | OUTPATIENT
Start: 2019-01-25 | End: 2019-01-25

## 2019-01-25 RX ORDER — FUROSEMIDE 40 MG
40 TABLET ORAL DAILY
Qty: 0 | Refills: 0 | Status: DISCONTINUED | OUTPATIENT
Start: 2019-01-25 | End: 2019-01-26

## 2019-01-25 RX ORDER — PANTOPRAZOLE SODIUM 20 MG/1
40 TABLET, DELAYED RELEASE ORAL
Qty: 0 | Refills: 0 | Status: DISCONTINUED | OUTPATIENT
Start: 2019-01-25 | End: 2019-01-28

## 2019-01-25 RX ORDER — FUROSEMIDE 40 MG
40 TABLET ORAL ONCE
Qty: 0 | Refills: 0 | Status: COMPLETED | OUTPATIENT
Start: 2019-01-25 | End: 2019-01-25

## 2019-01-25 RX ORDER — METOPROLOL TARTRATE 50 MG
25 TABLET ORAL
Qty: 0 | Refills: 0 | Status: DISCONTINUED | OUTPATIENT
Start: 2019-01-25 | End: 2019-01-26

## 2019-01-25 RX ORDER — PRAMIPEXOLE DIHYDROCHLORIDE 0.12 MG/1
0.25 TABLET ORAL AT BEDTIME
Qty: 0 | Refills: 0 | Status: DISCONTINUED | OUTPATIENT
Start: 2019-01-25 | End: 2019-01-28

## 2019-01-25 RX ADMIN — AZITHROMYCIN 255 MILLIGRAM(S): 500 TABLET, FILM COATED ORAL at 21:24

## 2019-01-25 RX ADMIN — PRAMIPEXOLE DIHYDROCHLORIDE 0.25 MILLIGRAM(S): 0.12 TABLET ORAL at 21:37

## 2019-01-25 RX ADMIN — CEFTRIAXONE 100 GRAM(S): 500 INJECTION, POWDER, FOR SOLUTION INTRAMUSCULAR; INTRAVENOUS at 17:00

## 2019-01-25 RX ADMIN — Medication 25 MILLIGRAM(S): at 17:22

## 2019-01-25 RX ADMIN — Medication 40 MILLIGRAM(S): at 17:22

## 2019-01-25 NOTE — H&P ADULT - ASSESSMENT
89/F admitted with new onset afib, bilateral lower extremity edema.  Acute CHF exacerbation  start lasix 40mg IV QD  start metoprolol 25mg BID  check ECHO, TSH   Cardiology consult- Dr. Ledezma informed   discussed AC with patient, she is hesitant at this time due to hx of gastric cancer, will think about it.   venous duplex, r/o DVT     #SAMUEL, on CKD, secondary to hypervolemia  monitor daily BMP, Mg, Phos    #Hyperkalemia, re-check K+    #Anemia, last Hgb ~12 in may 2018  check iron studies, B12, folate   check stool guaiac  GI consult- Dr. Bobby     #DVT prophylaxis    CAPRINI SCORE [CLOT]    AGE RELATED RISK FACTORS                                                       MOBILITY RELATED FACTORS  [ ] Age 41-60 years                                            (1 Point)                  [ ] Bed rest                                                        (1 Point)  [ ] Age: 61-74 years                                           (2 Points)                 [ ] Plaster cast                                                   (2 Points)  [x ] Age= 75 years                                              (3 Points)                 [ ] Bed bound for more than 72 hours                 (2 Points)    DISEASE RELATED RISK FACTORS                                               GENDER SPECIFIC FACTORS  [x ] Edema in the lower extremities                       (1 Point)                  [ ] Pregnancy                                                     (1 Point)  [ ] Varicose veins                                               (1 Point)                  [ ] Post-partum < 6 weeks                                   (1 Point)             [ ] BMI > 25 Kg/m2                                            (1 Point)                  [ ] Hormonal therapy  or oral contraception          (1 Point)                 [ ] Sepsis (in the previous month)                        (1 Point)                  [ ] History of pregnancy complications                 (1 point)  [ ] Pneumonia or serious lung disease                                               [ ] Unexplained or recurrent                     (1 Point)           (in the previous month)                               (1 Point)  [ ] Abnormal pulmonary function test                     (1 Point)                 SURGERY RELATED RISK FACTORS  [ ] Acute myocardial infarction                              (1 Point)                 [ ]  Section                                             (1 Point)  [ ] Congestive heart failure (in the previous month)  (1 Point)               [ ] Minor surgery                                                  (1 Point)   [ ] Inflammatory bowel disease                             (1 Point)                 [ ] Arthroscopic surgery                                        (2 Points)  [ ] Central venous access                                      (2 Points)                [ ] General surgery lasting more than 45 minutes   (2 Points)       [ ] Stroke (in the previous month)                          (5 Points)               [ ] Elective arthroplasty                                         (5 Points)                                                                                                                                               HEMATOLOGY RELATED FACTORS                                                 TRAUMA RELATED RISK FACTORS  [ ] Prior episodes of VTE                                     (3 Points)                [ ] Fracture of the hip, pelvis, or leg                       (5 Points)  [ ] Positive family history for VTE                         (3 Points)                 [ ] Acute spinal cord injury (in the previous month)  (5 Points)  [ ] Prothrombin 12371 A                                     (3 Points)                 [ ] Paralysis  (less than 1 month)                             (5 Points)  [ ] Factor V Leiden                                             (3 Points)                  [ ] Multiple Trauma within 1 month                        (5 Points)  [ ] Lupus anticoagulants                                     (3 Points)                                                           [ ] Anticardiolipin antibodies                               (3 Points)                                                       [ ] High homocysteine in the blood                      (3 Points)                                             [ ] Other congenital or acquired thrombophilia      (3 Points)                                                [ ] Heparin induced thrombocytopenia                  (3 Points)                                          Total Score [     4     ]

## 2019-01-25 NOTE — H&P ADULT - NSHPLABSRESULTS_GEN_ALL_CORE
9.2    5.7   )-----------( 173      ( 25 Jan 2019 14:20 )             31.0       01-25    142  |  112  |  54  ----------------------------<  94  5.5   |  20  |  2.69    Ca    8.6      25 Jan 2019 14:20    TPro  7.2  /  Alb  2.8  /  TBili  0.5  /  DBili  x   /  AST  61  /  ALT  42  /  AlkPhos  149  01-25    PT/INR - ( 25 Jan 2019 14:20 )   PT: 11.8 sec;   INR: 1.05 ratio         PTT - ( 25 Jan 2019 14:20 )  PTT:28.4 sec  CARDIAC MARKERS ( 25 Jan 2019 14:20 )  .019 ng/mL / x     / x     / x     / x

## 2019-01-25 NOTE — ED PROVIDER NOTE - PROGRESS NOTE DETAILS
New onset Afib with LE edema- will admit- spoke with Negro- pt accepted Rod: Pt was declined by hospitalist when SAMANTHA Fernando gave report. Felt that she could be discharged. However I called back: Patient with new onset Afib with LE edema- will admit. She has no cardiologist or cardiac history. spoke with Negro- pt accepted.  CXR with [facility]. Patient without fever. HR ranges from 89 to 125bpm. No murmur noted by me on examination. No SOB. No cough. Although there is pneumonia, the patient is not septic.

## 2019-01-25 NOTE — ED ADULT NURSE NOTE - NS ED NURSE PATIENT LEFT UNIT TIME
PROGRESS NOTE  [facility] HOSPITALIST SERVICE    Patient: Arelis Montana Today's Date: 1/21/2018   YOB: 1937 Date of Admission: 1/16/2018   MR Number: [id number] Attending Physician: Nimesh Wong MD     Code Status:  No Resuscitation with Maximal Medical Support    Hospital Day: 6    Primary Care Provider: Michael Jett MD  Consulting Physician(s): Dr. Fermin from Neurology   Transfusion:  None  Procedures:  None    Active Issues and Reason for Visit:  Principal Problem:    Altered mental status  Active Problems:    Rheumatic mitral stenosis    AS (aortic stenosis)    Diabetes mellitus (CMS/[facility])    Acute renal insufficiency    History of DVT of lower extremity    Type II or unspecified type diabetes mellitus without mention of complication, not stated as uncontrolled    Dementia    Acute CHF (CMS/[facility])    Closed displaced intertrochanteric fracture of right femur (CMS/[facility])    S/P ORIF (short TFN) right IT hip fracture - 12/29/17    Bilateral pneumonia    Metabolic acidosis, increased anion gap    Constipation      Assessment and Plan:  Problem list as noted above.  Altered mental status:CT head negative. Hx dementia. Mri with no stroke. B12 level pending. Discontinued tramadol. Patient continues to be altered and drowsy. ABG with alkalosis, no CO2 retention, hypoxic however o2 sat improved now and patient on warfarin. Less likely to have PE .  - Dr. Fermin recs EEG which will be done Tuesday. Patient does have intermittent twitching in legs.   Hold off on keppra at this time, Dr. Fermin to see the patient today   - Nutrition consult placed. Poor po intake for 2weeks now. Discussed goals of care with daughter. Patient is DNR. No feeding tube if patient to decline further.      Constipation: With poor po intake. Cont bowel regimen. Suppository or enema prn.      Bilateral pneumonia: Chest x-ray with bilateral infiltrates medial lung base and bilateral pleural effusions. On day 6 IV antibiotics  today.     Acute on chronic CHF exacerbation: Severe valvular disease with preserved Ef. Reduce the dose of p.o. Lasix today due to poor p.o. Intake. Okay to start gentle IV fluids. Continue to monitor for hypoxia shortness of breath. On electrolyte replacement protocol for hypokalemia and hypomagnesemia.     Hx DVT: On chronic anticoagulation, INR at goal. pharmacy to dose warfarin.      Type 2 diabetes mellitus: Continue NPH insulin due to poor p.o. intake, labile blood sugars. continue sliding scale insulin.       Right hip fracture s/p ORIF:  Cont PT, OT       Disposition:  To be determined.    All orders have been entered electronically through [facility].  Patient seen during multidisciplinary rounds with RN  Care plan communicated with patient and staff.  Care plan communicated with family.    Subjective:  Arelis Montana is a 80 year old year old female who is being seen in follow up for Altered mental status. Patient very lethargic this morning, difficult to arouse. Not eating well. No respiratory distress. Patient able to repeat questions slowly but appears too weak.     Past Medical/Surgical/Social/Family Histories reviewed as recorded in the admit H&P (dated 1/16/2018).  Meds and Allergies reviewed as recorded in EPIC.    Scheduled meds and infusions:  • furosemide  20 mg Oral BID   • warfarin  1 mg Oral Once   • azithromycin  500 mg Oral Daily   • amLODIPine  5 mg Oral Daily   • aspirin  81 mg Oral Daily   • docusate sodium-sennosides  1-2 tablet Oral Daily   • pantoprazole  40 mg Oral QAM AC   • metoPROLOL tartrate  50 mg Oral Daily   • citalopram  40 mg Oral Daily   • clopidogrel  75 mg Oral Daily   • ezetimibe  10 mg Oral Daily   • sodium chloride (PF)  2 mL Injection 2 times per day   • VANCOMYCIN - PHARMACIST MONITORED   Does not apply See Admin Instructions   • insulin lispro   Subcutaneous 4x Daily WC   • cefTRIAXone (ROCEPHIN) IVPB  1,000 mg Intravenous Daily   • vancomycin (VANCOCIN) IVPB  1,000 mg  Intravenous Daily   • WARFARIN - PHARMACIST MONITORED   Does not apply See Admin Instructions     • dextrose 5 % infusion         Review of Systems:  Respiratory:  (-)cough, (-)wheezing, (-)shortness of breath, (-)dyspnea on exertion  Cardiovascular:  (-)chest pain, (-)palpitations, (-)PND, (-)orthopnea, (-)lower extremity edema  GI: (+)constipation  :  (-)dysuria, (-)hematuria, (-)urinary frequency  Endocrine:  (-)polyuria, (-)polydipsia, (-)unintentional weight gain      OBJECTIVE:  Vital 24 Hour Range Most Recent Value   Temperature Temp  Min: 99.1 °F (37.3 °C)  Max: 99.3 °F (37.4 °C) 99.2 °F (37.3 °C)   Pulse Pulse  Min: 70  Max: 94 94   Respiratory Resp  Min: 16  Max: 20 16   Blood Pressure BP  Min: 129/71  Max: 168/79 168/79   Pulse Oximetry SpO2  Min: 93 %  Max: 95 %    O2 No Data Recorded      Vital Most Recent Value First Value   Weight 70.1 kg Weight: 72.1 kg   Height 5' 3\" (160 cm) Height: 5' 3\" (160 cm)       Intake/Output Summary (Last 24 hours) at 01/21/18 1053  Last data filed at 01/21/18 1011   Gross per 24 hour   Intake              490 ml   Output              300 ml   Net              190 ml     Current diet:  Cardiac, Consistent Carb Moderate (45-75 Gm/meal) Diet  Daily - Am Snack; Standard Oral Supplement (4 Oz, Please Vary Flvors) Oral Nutrition Supplement  Daily - Pm Snack; High Protein Pudding (please Vary Flavors) Oral Nutrition Supplement  Daily - Hs Snack; Standard Oral Supplement (4 Oz, Please Vary Flavors) Oral Nutrition Supplement    Physical Exam:  General - Able to tell name, lethargic and unable to open eyes. Appears very weak  Cor - S1 S2 heard with no murmurs or rubs.  Pulm - Clear to auscultation bilaterally with no crackles or wheezing  Abd - Soft, mild tenderness noted with exam, nondistended, bowel sounds heard.  Ext  -  Warm without clubbing or cyanosis.  No pedal edema.    Ancillary Studies including laboratory results are reviewed as recorded in [facility] 1/21/2018 10:53 AM.  REGINE  subset of labs and results are listed below:  lab last 6 hrs;   Recent Results (from the past 6 hour(s))   Metered blood glucose    Collection Time: 01/21/18  9:04 AM   Result Value Ref Range    Glucose Bedside  (H) 65 - 99 mg/dL   Metered blood glucose    Collection Time: 01/21/18  9:35 AM   Result Value Ref Range    Glucose Bedside POC 89 65 - 99 mg/dL   Metered blood glucose    Collection Time: 01/21/18 12:46 PM   Result Value Ref Range    Glucose Bedside  (H) 65 - 99 mg/dL       Ct Head Brain    Result Date: 1/16/2018  EXAM:  CT HEAD WITHOUT CONTRAST CLINICAL HISTORY: ALTERED MENTAL STATUS (AMS) COMPARISON:  12/28/2017 FINDINGS:  No acute intracranial hemorrhage, mass effect, midline shift, or abnormal extraaxial fluid.  Age-related intracranial volume loss is noted. Patchy foci of low attenuation in the periventricular and deep white matter are nonspecific, but of the type typically ascribed to age-related microvascular ischemic changes.  No CT evidence of an evolving infarct in a major vascular territory.  Visualized calvaria, mastoids, paranasal sinuses, and orbits are unremarkable.     IMPRESSION:  No acute intracranial findings.     Ct Head Brain    Result Date: 12/28/2017  Exam: CT HEAD WO CONTRAST Comparison: 1/29/2016 History: HEAD INJURY MILD OR MODERATE ACUTE, NO NEUROLOGICAL DEFICIT Technique: Axial noncontrast images are acquired from the foramen magnum to the vertex. Sagittal and coronal images were reconstructed from the original source data.  Findings: There are no abnormal intra-axial or extra-axial fluid collections. No intraparenchymal hemorrhages. No shift of the midline structures. Scattered areas of decreased attenuation the periventricular white matter and centrum semiovale consistent with small vessel ischemic disease. Lacunar infarcts in the basal ganglia bilaterally. No findings to suggest acute ischemia. Bones of the calvarium and skull base are intact. Visualized  paranasal sinuses and mastoid air cells are clear.     Impression: No acute intracranial abnormality.     Ct Cervical Spine    Result Date: 12/28/2017  Exam: CT CERVICAL SPINE WO CONTRAST Comparison: None History: C-SPINE TRAUMA, NEXUS/CCR NEGATIVE, LOW RISK Technique: Axial noncontrast images are acquired from the skull base to the cervical spine. Sagittal and coronal images were reconstructed from the original source data.  Findings:  There are no acute fractures. No splaying of the spinous processes. No abnormal prevertebral soft tissue swelling. Disc space narrowing throughout the cervical spine most pronounced at at C3-4 and C5-C7. Large posterior disc osteophyte complex at C6-C7 create mild to moderate central narrowing of the spinal cord. Additional disc osteophyte complex at C3-4 create mild to moderate central and left pericentral spinal cord narrowing. Prominent osteophyte at C6 on the left encroaches on the adjacent foramen transversarium narrowing the foramina by approximately 50%. Facet joint hypertrophy bilaterally at multiple levels. Additional degenerative change at C1-C2. Soft tissues of the neck are unremarkable. 6 mm low attenuating left thyroid lobe nodule. Minimal biapical scarring.     Impression: No acute bone or joint abnormality.     Mri Brain    Result Date: 1/20/2018  EXAMINATION: MRI of the brain with and without contrast INDICATION: Altered mental status TECHNIQUE: Multiplanar, multisequence MRI of the brain with and without contrast. Patient received 7 mL IV gadavist FINDINGS: Examination is degraded by patient motion. No evidence of restricted diffusion or intracranial hemorrhage. No abnormal extracranial or intracranial collections. Normal configuration of the ventricles. Mild subcortical, deep, and periventricular white matter T2/FLAIR hyperintensities. Age-related cerebral atrophy. Major intracranial vascular flow voids are maintained. The osseous calvarium is unremarkable. Trace  fluid within the right mastoid air cells. No abnormal cranial enhancement. The sella and orbits are unremarkable.     IMPRESSION: 1. No acute intracranial pathology. 2. Findings of chronic small vessel ischemic disease and age-related atrophy.     Xr Chest Ap Or Pa    Result Date: 1/19/2018  EXAM: XR CHEST AP OR PA INDICATION:  Hypoxia. COMPARISON:  1/16/2018. FINDINGS: There has been deterioration in the appearance of the chest with increasing medial lung base opacities likely consequent to bilateral pleural effusions and associated atelectatic/infiltrative changes. This is most prominent involving the left lung base where there is marked retrocardiac opacity and obscuration of the left hemidiaphragm. The heart size is mildly enlarged. Mitral annular calcifications are again seen. Degenerative changes are noted the spine and shoulders. The mid and upper lungs demonstrate no consolidative changes.     IMPRESSION: Bibasilar opacities, left greater than right, suggestive of a combination of atelectasis, effusion, and/or infiltrate. Radiographically, this appears probably slightly worse when compared with the previous study, as noted above. Upper lung fields remain clear.     Xr Chest Ap Or Pa - Portable    Result Date: 1/16/2018  XR CHEST AP OR PA INDICATION:  AMS COMPARISON:  Portable chest 12/28/2017 and multiple prior exams. FINDINGS: There has been deterioration in the appearance of the chest with increasing medial lung base opacities likely consequent to bilateral pleural effusions and associated atelectatic/infiltrative changes. The interstitial markings are also increased and the vasculature more prominent than on previous study. The heart size is mildly enlarged. Ventral annular calcifications are again seen. Degenerative changes are noted the spine and shoulders. The mid and upper lungs demonstrate no consolidative changes.     IMPRESSION: 1. Deterioration appearance the chest with development of bilateral  pleural effusions and bibasal atelectatic/infiltrative changes in medial lung bases. 2. Interval development of interstitial congestion and mild vascular congestion. Cardiac prominence is also seen. 3. Constellation of features suggesting some cardiac decompensation/fluid overload..     Xr Chest Ap Or Pa    Result Date: 12/28/2017  PROCEDURE: X-RAY CHEST AP OR PA CLINICAL INDICATION: Pre-surgical surgical. Right hip fracture.     TECHNIQUE: Single AP portable chest x-ray. COMPARISON: 4/10/2017. FINDINGS: The cardiac silhouette is moderately enlarged. Persistent moderate left hemidiaphragm elevation. The lungs show mild left base opacification suggesting atelectasis. There is minimal linear right medial lung field opacification, probably related to mild parenchymal scarring versus atelectasis. Bronchovascular markings are slightly prominent overall.     IMPRESSION: Probable mild left base atelectasis, right medial lung base parenchymal scarring, persistent moderate cardiomegaly. Coarse bronchovascular markings again noted.         Xr Hip 2-3 View Right And Pelvis    Result Date: 12/28/2017  EXAM: X-RAY HIP 2-3 VIEWS RIGHT AND PELVIS CLINICAL INDICATION: Fall. Pain. TECHNIQUE: AP and lateral right hip, AP pelvis x-rays COMPARISON: None FINDINGS: Right hip: Grossly displaced and angulated intertrochanteric fracture. No dislocation. AP pelvis: Left hip shows minimal joint space narrowing. No fractures of the left hip. No pelvic fractures identified. Mild degenerative disease of the SI joints bilaterally.     IMPRESSION: Grossly displaced and angulated intertrochanteric right hip fracture.     Fl Pacs Record Nr    Non-reportable by Radiologist.    Us Lower Extremity Venous Duplex Left    Result Date: 1/1/2018  EXAM: US LOWER EXTREMITY VENOUS DUPLEX LEFT EXAM DATE: 1/1/2018 10:38 AM. HISTORY: R/O DVT.. COMPARISON: 02/09/2015. TECHNIQUE: Gray-scale, color-flow, and spectral Doppler ultrasound evaluation is performed of the  common femoral, femoral, popliteal, peroneal, posterior tibial, profunda femoris, and greater saphenous veins. FINDINGS: The left common femoral, femoral, and popliteal veins demonstrate normal gray-scale evaluation, normal color flow, compressibility, and normal augmentation. Imaging over the left calf demonstrates the posterior tibial and peroneal veins to have normal gray-scale imaging, color flow, and augmentation. Right common femoral vein demonstrates normal flow and compressibility. The greater saphenous vein compresses normally. No evidence of localized fluid collections within the soft tissues.     IMPRESSION: 1. Unremarkable examination, without evidence of deep venous thrombosis or superficial thrombophlebitis.        Unresulted Labs     Start     Ordered    01/21/18 0600  Vitamin B1 Whole Blood  AM DRAW,   AMDR      01/20/18 1419    01/17/18 0600  Prothrombin Time  AM DRAW,   AMDR      01/16/18 1648    01/16/18 1700  Metered Blood Glucose   4 TIMES DAILY BEFORE MEALS & N,   Routine      01/16/18 1325        Unresulted Procedure     None        Microbiology:  Microbiology Results  (Last 10 results in the past 7 days)    Specimen Gram Smear Culture Result Status      01/16/18  1300  01/16/18  1300 01/16/18  1300     BLOOD ANTECUBITAL,LEFT  NO GROWTH 5 DAYS. 01/21/2018 FINAL     01/16/18  0948  01/16/18  0948 01/16/18  0948     BLOOD ANTECUBITAL,RIGHT  NO GROWTH 5 DAYS. 01/21/2018 FINAL           Nimesh Wong MD  Hospitalist  [facility]  1/21/2018 10:53 AM   17:36

## 2019-01-25 NOTE — ED ADULT NURSE NOTE - NSIMPLEMENTINTERV_GEN_ALL_ED
Implemented All Universal Safety Interventions:  [place] to call system. Call bell, personal items and telephone within reach. Instruct patient to call for assistance. Room bathroom lighting operational. Non-slip footwear when patient is off stretcher. Physically safe environment: no spills, clutter or unnecessary equipment. Stretcher in lowest position, wheels locked, appropriate side rails in place.

## 2019-01-25 NOTE — ED ADULT NURSE NOTE - OBJECTIVE STATEMENT
Pt presents A&Ox3 with family member from her PCP's office for an abnormal EKG.  As per pt past 2 weeks has been having b/l leg swelling and SOB, saw Dr. Ackerman today where EKG was performed.   Pt states hx of restless leg syndrome, gastric cancer.  At this time denies dizziness, SOB, CP, n/v/d/c.

## 2019-01-25 NOTE — HEALTH RISK ASSESSMENT
[No falls in past year] : Patient reported no falls in the past year [0] : 2) Feeling down, depressed, or hopeless: Not at all (0) [] : No [[id number]] : 0

## 2019-01-25 NOTE — ASSESSMENT
[FreeTextEntry1] : PAtient refereed to ED re: further eval and treatment.\par Case D/w Family member and ED PA.

## 2019-01-25 NOTE — ED PROVIDER NOTE - OBJECTIVE STATEMENT
89 y/o F h/o Gastric CA sent in by PMD Dr. Reis for c/o "increased swelling to B/L LE's with dyspnea on exertion". Upon PMD's exam, patient was found to be in new onset Aflutter 140's. Upon arrival, patient states she feels well. Denies chest pain, palpitations, shortness of breath, nausea, weakness. States her legs have gradually increased in size B/L. Patient denies dyspnea on exertion as stated by PMD in her call over.  As per PMD baseline creatinine 2.0  PMD Smiley [phone]

## 2019-01-25 NOTE — H&P ADULT - NSHPREVIEWOFSYSTEMS_GEN_ALL_CORE
REVIEW OF SYSTEMS:  CONSTITUTIONAL: No fever, weight loss, or fatigue  EYES: No eye pain, visual disturbances, or discharge  ENMT:  No difficulty hearing, tinnitus, vertigo; No sinus or throat pain  NECK: No neck pain or neck stiffness  RESPIRATORY: No cough, wheezing, chills or hemoptysis; No shortness of breath  CARDIOVASCULAR: No chest pain, palpitations, dizziness, or leg swelling  GASTROINTESTINAL: No abdominal pain, No nausea, vomiting, or hematemesis; No diarrhea or constipation  GENITOURINARY: No dysuria, frequency, hematuria, or incontinence  NEUROLOGICAL: No headaches, memory loss, loss of strength, numbness, or tremors  SKIN: No itching, burning, rashes, or lesions   MUSCULOSKELETAL: No joint pain or swelling; No muscle, back, or extremity pain  PSYCHIATRIC: No depression, anxiety, mood swings, or difficulty sleeping  HEME/LYMPH: No easy bruising or bleeding  ALLERY AND IMMUNOLOGIC: No hives or eczema    All other ROS reviewed and negative except as otherwise stated

## 2019-01-25 NOTE — PHYSICAL EXAM
[No Acute Distress] : no acute distress [Normal Sclera/Conjunctiva] : normal sclera/conjunctiva [Normal Outer Ear/Nose] : the outer ears and nose were normal in appearance [Normal Oropharynx] : the oropharynx was normal [No JVD] : no jugular venous distention [Supple] : supple [No Respiratory Distress] : no respiratory distress  [No Accessory Muscle Use] : no accessory muscle use [Soft] : abdomen soft [Non Tender] : non-tender [Non-distended] : non-distended [No Masses] : no abdominal mass palpated [No HSM] : no HSM [Normal Bowel Sounds] : normal bowel sounds [No Rash] : no rash [Alert and Oriented x3] : oriented to person, place, and time [de-identified] : decreased breath sound  [de-identified] : tachycardic, irregular  [de-identified] : + 3 b/l LE edema  [de-identified] : walking with the [place]

## 2019-01-25 NOTE — ED PROVIDER NOTE - CHPI ED SYMPTOMS NEG
no back pain/no chest pain/no cough/no shortness of breath/no vomiting/no syncope/no dizziness/no fever/no chills/no diaphoresis/no nausea

## 2019-01-25 NOTE — ED ADULT NURSE REASSESSMENT NOTE - NS ED NURSE REASSESS COMMENT FT1
Attempted to call and give report to Tele, receiving RN busy with another pt, as per unit clerk RN will call back.

## 2019-01-25 NOTE — CONSULT NOTE ADULT - PROBLEM SELECTOR RECOMMENDATION 9
Telemetry, av node blockers. B blocker and/or cardizem po or iv  echo, tfts. consider risks/benefits of oral a/c given age and history of gastric cancer.

## 2019-01-25 NOTE — ED PROVIDER NOTE - ATTENDING CONTRIBUTION TO CARE
Eval with SAMANTHA Fernando. Patient with new lower extremity edema that is marked. No SOB. Lungs clear on exam. However, afib is not rate 140's. She is about 80s to 120s, fluctuating. She reports no known history of this. Denies palpitations. Edema occurring for about 3 days. Plan for labs with cxr. 2+ pitting edema. Will check labs including creatinine. Reassess. Cardiology input will be needed. I performed a face to face bedside interview with patient regarding history of present illness, review of symptoms and past medical history. I completed an independent physical exam.  I have discussed the patient's plan of care with Physician Assistant (PA). I agree with note as stated above, having amended the EMR as needed to reflect my findings.   This includes History of Present Illness, HIV, Past Medical/Surgical/Family/Social History, Allergies and Home Medications, Review of Systems, Physical Exam, and any Progress Notes during the time I functioned as the attending physician for this patient.

## 2019-01-25 NOTE — H&P ADULT - NSHPPHYSICALEXAM_GEN_ALL_CORE
PHYSICAL EXAM:  GENERAL: NAD, well-groomed, well-developed, AO X 3  HEAD:  Atraumatic, Normocephalic  EYES: EOMI, PERRLA, conjunctiva and sclera clear  NECK: Supple, No JVD  CHEST/LUNG: Clear to auscultation bilaterally; No rales, rhonchi, wheezing, or rubs  HEART: Regular rate and rhythm; S1/S2, No murmurs, rubs, or gallops  ABDOMEN: Soft, Nontender, Nondistended; Bowel sounds present  VASCULAR: Normal pulses, Normal capillary refill  EXTREMITIES:  2+ Peripheral Pulses, No clubbing, cyanosis, or edema  SKIN: Warm, Intact  NERVOUS SYSTEM; CN 2-12 intact, No focal deficits

## 2019-01-25 NOTE — REVIEW OF SYSTEMS
The following information and instructions were given:    NPO after MN except sips of water with routine prescribed medication (except blood thinner, diabetes, or weight reducing medication) unless otherwise instructed by your physician.  Do not eat, drink, smoke or chew gum after midnight the night before surgery. This also includes no mints.    DO NOT shave for two days before your procedure.  Do not wear makeup.      DO NOT wear fingernail polish (gel/regular) and/or acrylic/artificial nails on the day of surgery.   If a patient had recent manicure and would rather not remove polish or artificial nails, then the minimum requirement is that the polish/artificial nails must be removed from the middle finger on each hand.      If patient is having surgery/procedure on an upper extremity, then the patient was instructed that fingernail polish/artificial fingernails must be removed for surgery.  NO EXCEPTIONS.      If patient is having surgery/procedure on a lower extremity, then the patient was instructed that toenail polish on both extremities must be removed for surgery.  NO EXCEPTIONS.    Remove all jewelry (advised to go to jeweler if unable to remove).  Jewelry, especially rings, can no longer be taped for surgery.    Leave anything you consider valuable at home.    Leave your suitcase in the car until after your surgery.    Bring the following with you (if applicable)       -picture ID and insurance cards       -Co-pay/deductible required by insurance       -Medications in the original bottles (not a list) including all over-the-counter  medications if not brought to PAT       -Copy of advance directive, living will or power of  documents if not  brought to [facility]       -CPAP or BIPAP mask and tubing (do not bring machine)       -Skin prep instructions sheet       -PAT Pass    Education booklet, brochure, handout or binder given to patient.    Pain Control After Surgery handout given to  patient.    Respirex use (handout given to patient) and pneumonia prevention.    Signs and Symptoms of infection discussed.    DVT Prevention education given.  Stressing the importance of ambulation.    Patient to apply Chlorhexadine wipes to surgical area (as instructed) the night before procedure and the AM of procedure.    When applicable for ERAS patients (colon, orthropedic), patients were instructed to drink 20 ounces of Gatorade or G2 for diabetics (or until full) the morning of surgery.  The Gatorade or G2 must be consumed at least 3 hours before surgery start time.  No RED Gatorade or G2.  Appropriated ERAS handout given to patient during PAT visit.        Patient/family provided a [facility] Heart Surgery book (if not already provided by the CT surgeon's office).  Encouraged patient and family to read the Heart Surgery book if they had not already done so.  Also completed Heart Surgery pre surgery checklist with patient.  Verified with patient that exercise handout was received, if not, then one was provided during PAT visit.      Education during PAT visit included general perioperative instructions that are routine for all surgical patients (PAT PASS, wipes, directions to pre-op,e tc.).  Additionally, a handout was provided and discussed that stressed the importance of Honesty, Incentive Spirometry use, Ambulation, and Pain control.  This handout also explained the tubes in place during immediate post op recovery.  Verbal instructions given as well.     Patient and/or family verbalized understanding of education and reinforcement was provided as needed.    Instructed patient to take 325 mg the night before heart surgery as per CT surgeon's order.  Patient and/or family verbalized understanding.      Instructed patient to take 325 mg the night before heart surgery as per CT surgeon's order.  Patient and/or family verbalized understanding.     [Lower Ext Edema] : lower extremity edema [Dyspnea on Exertion] : dyspnea on exertion [Negative] : Psychiatric [Chest Pain] : no chest pain [Palpitations] : no palpitations [Leg Claudication] : no leg claudication [Orthopnea] : no orthopnea [Paroysmal Nocturnal Dyspnea] : no paroysmal nocturnal dyspnea [Shortness Of Breath] : no shortness of breath [Wheezing] : no wheezing [Cough] : no cough [FreeTextEntry5] : worsening LE edema  [de-identified] : left thigh bruise

## 2019-01-25 NOTE — H&P ADULT - HISTORY OF PRESENT ILLNESS
patient is 89/F comes to the ER, being sent by PMD for new atrial flutter. she was seen in the office for lower extremity edema, b/l, since one week. EKG was done in clinic which showed aflutter. denies dyspnea, chest pain, palpitations. She has history of gastric cancer diagnosed in 1990s and was told to be off aspirin.

## 2019-01-25 NOTE — HISTORY OF PRESENT ILLNESS
[Family Member] : family member [FreeTextEntry8] : cc: b/l LE edema, \par \par Patient was brought by the family member c/o worsening  b/l LE edema for few days, noted to have PRICE - she denies SOB, no CP, no N,C,D. Patient denies fever, no cough.

## 2019-01-25 NOTE — ED PROVIDER NOTE - MEDICAL DECISION MAKING DETAILS
89 y/o F h/o Gastric CA sent in by PMD Dr. Reis for c/o "increased swelling to B/L LE's with dyspnea on exertion with new onset A- flutter on EKG in office- Cardiac work up, Cardiac monitoring, rate control, consider OBS admit

## 2019-01-26 LAB
ANION GAP SERPL CALC-SCNC: 16 MMOL/L — SIGNIFICANT CHANGE UP (ref 5–17)
ANION GAP SERPL CALC-SCNC: 18 MMOL/L — HIGH (ref 5–17)
ANION GAP SERPL CALC-SCNC: 19 MMOL/L — HIGH (ref 5–17)
BUN SERPL-MCNC: 61 MG/DL — HIGH (ref 7–23)
BUN SERPL-MCNC: 61 MG/DL — HIGH (ref 7–23)
BUN SERPL-MCNC: 63 MG/DL — HIGH (ref 7–23)
CALCIUM SERPL-MCNC: 8.6 MG/DL — SIGNIFICANT CHANGE UP (ref 8.4–10.5)
CALCIUM SERPL-MCNC: 8.8 MG/DL — SIGNIFICANT CHANGE UP (ref 8.4–10.5)
CALCIUM SERPL-MCNC: 9.2 MG/DL — SIGNIFICANT CHANGE UP (ref 8.4–10.5)
CHLORIDE SERPL-SCNC: 111 MMOL/L — HIGH (ref 96–108)
CHLORIDE SERPL-SCNC: 112 MMOL/L — HIGH (ref 96–108)
CHLORIDE SERPL-SCNC: 113 MMOL/L — HIGH (ref 96–108)
CO2 SERPL-SCNC: 12 MMOL/L — LOW (ref 22–31)
CO2 SERPL-SCNC: 12 MMOL/L — LOW (ref 22–31)
CO2 SERPL-SCNC: 19 MMOL/L — LOW (ref 22–31)
CREAT SERPL-MCNC: 3.15 MG/DL — HIGH (ref 0.5–1.3)
CREAT SERPL-MCNC: 3.24 MG/DL — HIGH (ref 0.5–1.3)
CREAT SERPL-MCNC: 3.53 MG/DL — HIGH (ref 0.5–1.3)
FERRITIN SERPL-MCNC: 23 NG/ML — SIGNIFICANT CHANGE UP (ref 15–150)
FOLATE SERPL-MCNC: 14.4 NG/ML — SIGNIFICANT CHANGE UP
GLUCOSE SERPL-MCNC: 123 MG/DL — HIGH (ref 70–99)
GLUCOSE SERPL-MCNC: 66 MG/DL — LOW (ref 70–99)
GLUCOSE SERPL-MCNC: 83 MG/DL — SIGNIFICANT CHANGE UP (ref 70–99)
HCT VFR BLD CALC: 34.2 % — LOW (ref 34.5–45)
HGB BLD-MCNC: 10.2 G/DL — LOW (ref 11.5–15.5)
IRON SATN MFR SERPL: 18 UG/DL — LOW (ref 30–160)
IRON SATN MFR SERPL: 5 % — LOW (ref 14–50)
MAGNESIUM SERPL-MCNC: 2.7 MG/DL — HIGH (ref 1.6–2.6)
MCHC RBC-ENTMCNC: 28.3 PG — SIGNIFICANT CHANGE UP (ref 27–34)
MCHC RBC-ENTMCNC: 29.8 GM/DL — LOW (ref 32–36)
MCV RBC AUTO: 95.2 FL — SIGNIFICANT CHANGE UP (ref 80–100)
PHOSPHATE SERPL-MCNC: 6.6 MG/DL — HIGH (ref 2.5–4.5)
PLATELET # BLD AUTO: 164 K/UL — SIGNIFICANT CHANGE UP (ref 150–400)
POTASSIUM SERPL-MCNC: 5.2 MMOL/L — SIGNIFICANT CHANGE UP (ref 3.5–5.3)
POTASSIUM SERPL-MCNC: 6 MMOL/L — HIGH (ref 3.5–5.3)
POTASSIUM SERPL-MCNC: 6.5 MMOL/L — CRITICAL HIGH (ref 3.5–5.3)
POTASSIUM SERPL-SCNC: 5.2 MMOL/L — SIGNIFICANT CHANGE UP (ref 3.5–5.3)
POTASSIUM SERPL-SCNC: 6 MMOL/L — HIGH (ref 3.5–5.3)
POTASSIUM SERPL-SCNC: 6.5 MMOL/L — CRITICAL HIGH (ref 3.5–5.3)
RBC # BLD: 3.39 M/UL — LOW (ref 3.8–5.2)
RBC # BLD: 3.59 M/UL — LOW (ref 3.8–5.2)
RBC # FLD: 16.4 % — HIGH (ref 10.3–14.5)
RETICS #: 37.3 K/UL — SIGNIFICANT CHANGE UP (ref 25–125)
RETICS/RBC NFR: 1.1 % — SIGNIFICANT CHANGE UP (ref 0.5–2.5)
SODIUM SERPL-SCNC: 142 MMOL/L — SIGNIFICANT CHANGE UP (ref 135–145)
SODIUM SERPL-SCNC: 143 MMOL/L — SIGNIFICANT CHANGE UP (ref 135–145)
SODIUM SERPL-SCNC: 147 MMOL/L — HIGH (ref 135–145)
TIBC SERPL-MCNC: 352 UG/DL — SIGNIFICANT CHANGE UP (ref 220–430)
UIBC SERPL-MCNC: 334 UG/DL — SIGNIFICANT CHANGE UP (ref 110–370)
VIT B12 SERPL-MCNC: 1055 PG/ML — SIGNIFICANT CHANGE UP (ref 232–1245)
WBC # BLD: 7.2 K/UL — SIGNIFICANT CHANGE UP (ref 3.8–10.5)
WBC # FLD AUTO: 7.2 K/UL — SIGNIFICANT CHANGE UP (ref 3.8–10.5)

## 2019-01-26 PROCEDURE — 99233 SBSQ HOSP IP/OBS HIGH 50: CPT

## 2019-01-26 PROCEDURE — 93306 TTE W/DOPPLER COMPLETE: CPT | Mod: 26

## 2019-01-26 PROCEDURE — 76770 US EXAM ABDO BACK WALL COMP: CPT | Mod: 26

## 2019-01-26 RX ORDER — HEPARIN SODIUM 5000 [USP'U]/ML
5000 INJECTION INTRAVENOUS; SUBCUTANEOUS EVERY 8 HOURS
Qty: 0 | Refills: 0 | Status: DISCONTINUED | OUTPATIENT
Start: 2019-01-26 | End: 2019-01-28

## 2019-01-26 RX ORDER — SODIUM BICARBONATE 1 MEQ/ML
650 SYRINGE (ML) INTRAVENOUS THREE TIMES A DAY
Qty: 0 | Refills: 0 | Status: DISCONTINUED | OUTPATIENT
Start: 2019-01-26 | End: 2019-01-28

## 2019-01-26 RX ORDER — SODIUM CHLORIDE 9 MG/ML
1000 INJECTION INTRAMUSCULAR; INTRAVENOUS; SUBCUTANEOUS
Qty: 0 | Refills: 0 | Status: DISCONTINUED | OUTPATIENT
Start: 2019-01-26 | End: 2019-01-26

## 2019-01-26 RX ORDER — SODIUM BICARBONATE 1 MEQ/ML
50 SYRINGE (ML) INTRAVENOUS ONCE
Qty: 0 | Refills: 0 | Status: COMPLETED | OUTPATIENT
Start: 2019-01-26 | End: 2019-01-26

## 2019-01-26 RX ORDER — SODIUM POLYSTYRENE SULFONATE 4.1 MEQ/G
30 POWDER, FOR SUSPENSION ORAL ONCE
Qty: 0 | Refills: 0 | Status: COMPLETED | OUTPATIENT
Start: 2019-01-26 | End: 2019-01-26

## 2019-01-26 RX ORDER — SODIUM CHLORIDE 9 MG/ML
1000 INJECTION, SOLUTION INTRAVENOUS
Qty: 0 | Refills: 0 | Status: DISCONTINUED | OUTPATIENT
Start: 2019-01-26 | End: 2019-01-28

## 2019-01-26 RX ORDER — CALCIUM GLUCONATE 100 MG/ML
1 VIAL (ML) INTRAVENOUS ONCE
Qty: 0 | Refills: 0 | Status: COMPLETED | OUTPATIENT
Start: 2019-01-26 | End: 2019-01-26

## 2019-01-26 RX ORDER — METOPROLOL TARTRATE 50 MG
25 TABLET ORAL EVERY 8 HOURS
Qty: 0 | Refills: 0 | Status: DISCONTINUED | OUTPATIENT
Start: 2019-01-26 | End: 2019-01-28

## 2019-01-26 RX ADMIN — SODIUM CHLORIDE 50 MILLILITER(S): 9 INJECTION, SOLUTION INTRAVENOUS at 16:10

## 2019-01-26 RX ADMIN — PRAMIPEXOLE DIHYDROCHLORIDE 0.25 MILLIGRAM(S): 0.12 TABLET ORAL at 21:12

## 2019-01-26 RX ADMIN — Medication 50 MILLIEQUIVALENT(S): at 13:46

## 2019-01-26 RX ADMIN — SODIUM POLYSTYRENE SULFONATE 30 GRAM(S): 4.1 POWDER, FOR SUSPENSION ORAL at 12:17

## 2019-01-26 RX ADMIN — Medication 25 MILLIGRAM(S): at 06:32

## 2019-01-26 RX ADMIN — Medication 200 GRAM(S): at 12:18

## 2019-01-26 RX ADMIN — Medication 650 MILLIGRAM(S): at 21:12

## 2019-01-26 RX ADMIN — Medication 25 MILLIGRAM(S): at 21:12

## 2019-01-26 RX ADMIN — SODIUM CHLORIDE 50 MILLILITER(S): 9 INJECTION, SOLUTION INTRAVENOUS at 21:12

## 2019-01-26 RX ADMIN — HEPARIN SODIUM 5000 UNIT(S): 5000 INJECTION INTRAVENOUS; SUBCUTANEOUS at 21:11

## 2019-01-26 RX ADMIN — PANTOPRAZOLE SODIUM 40 MILLIGRAM(S): 20 TABLET, DELAYED RELEASE ORAL at 06:32

## 2019-01-26 RX ADMIN — HEPARIN SODIUM 5000 UNIT(S): 5000 INJECTION INTRAVENOUS; SUBCUTANEOUS at 15:27

## 2019-01-26 RX ADMIN — Medication 40 MILLIGRAM(S): at 06:37

## 2019-01-26 RX ADMIN — Medication 650 MILLIGRAM(S): at 15:26

## 2019-01-26 NOTE — CONSULT NOTE ADULT - ASSESSMENT
·	SAMUEL, CKD 4: R/o Retention  ·	Hyperkalemia  ·	hyperchloremic acidosis  ·	Anemia  ·	A Flutter    Check kidney and bladder sono. Many need quiroz catheter. Rx for hyperkalemia. Monitor trend.   Monitor h/h trend. Cardiology follow up. Pt with baseline CKD. Will follow lytes and RF trend.   Further recommendations pending clinical course. Thank you for the courtesy of this referral.

## 2019-01-26 NOTE — PROGRESS NOTE ADULT - ASSESSMENT
89 yo f admitted 1-25-19 pmh ckd stage 4, gastric cancer presents with new onset atrial flutter    1. new onset atrial flutter: rate controlled c/w lopressor patient refusing a/c risk and benefits explained  2. hyperkalemia: repeat stat bmp  3. liat on ckd stage 4 baseline creatinine 1.8 elevated phosphorus low bicarb +hyperkalemia  renal consult placed. d/c lasix  4. anemia of renal disease renal consult placed start ferrous sulfate  5. dvt ppx: scd  6. dispo: pt consult placed 89 yo f admitted 1-25-19 pmh ckd stage 4, gastric cancer presents with new onset atrial flutter    1. new onset atrial flutter: rate controlled c/w lopressor patient refusing a/c risk and benefits explained  2. hyperkalemia: repeat stat bmp  3. liat on ckd stage 4 baseline creatinine 1.8 elevated phosphorus low bicarb +hyperkalemia  renal consult placed. d/c lasix  4. anemia of renal disease renal consult placed start ferrous sulfate  5. dvt ppx: heparin subq  6. dispo: pt consult placed

## 2019-01-26 NOTE — PROGRESS NOTE ADULT - ASSESSMENT
89 year old woman presents with lower extremity edema.  Found to have CHF ( acute and chronic diastolic CHF) and new atrial flutter/fibrillation.    Echo demonstrates severe tricuspid regurgitation, severe AS and RV enlargement and pleural effusion  Patient is refusing anticoagulation - history of gastric cancer.  CKD    Plan  1. Rate control... increase beta blocker  2. diuresis as needed - follow renal function  3. Probably not a candidate for aggressive cardiac workup given advanced age and CKD   4. Overall prognosis is guarded due to severe valvular heart disease.     further plans pending hospital course and workup  discussed with patient and with Medicine team

## 2019-01-26 NOTE — PATIENT PROFILE ADULT - BRADEN SENSORY
(4) no impairment Pt received in bed alert and oriented and resting in bed with c/o fall up stair with wrist injury.

## 2019-01-26 NOTE — CONSULT NOTE ADULT - SUBJECTIVE AND OBJECTIVE BOX
Patient is a 89y old  Female who presents with a chief complaint of sent by PMD for lower extremity edema (26 Jan 2019 13:57)    HPI:  patient is 89/F comes to the ER, being sent by PMD for new atrial flutter. she was seen in the office for lower extremity edema, b/l, since one week. EKG was done in clinic which showed aflutter. denies dyspnea, chest pain, palpitations. She has history of gastric cancer diagnosed in 1990s and was told to be off aspirin. (25 Jan 2019 16:37)    Renal consult called for SAMUEL. History obtained from chart and patient. Pt noted to have elevated potassium.       PAST MEDICAL HISTORY:  Gastric cancer      PAST SURGICAL HISTORY:  No significant past surgical history      FAMILY HISTORY:  No pertinent family history in first degree relatives      SOCIAL HISTORY: No smoking or alcohol use     Allergies    No Known Allergies    Intolerances      Home Medications:  pramipexole 0.25 mg oral tablet: 1 tab(s) orally once a day (at bedtime) (25 Jan 2019 16:47)  Protonix 40 mg oral delayed release tablet: 1 tab(s) orally once a day (25 Jan 2019 16:47)    MEDICATIONS  (STANDING):  heparin  Injectable 5000 Unit(s) SubCutaneous every 8 hours  metoprolol tartrate 25 milliGRAM(s) Oral every 8 hours  pantoprazole    Tablet 40 milliGRAM(s) Oral before breakfast  pramipexole 0.25 milliGRAM(s) Oral at bedtime  sodium bicarbonate 650 milliGRAM(s) Oral three times a day  sodium chloride 0.9%. 1000 milliLiter(s) (75 mL/Hr) IV Continuous <Continuous>    MEDICATIONS  (PRN):      REVIEW OF SYSTEMS:  General: NAD  Respiratory: No cough, SOB  Cardiovascular: No CP or Palpitations	  Gastrointestinal: No nausea, Vomiting. No diarrhea  Genitourinary: No urinary complaints	  Musculoskeletal: No new rash or lesions	  all other systems negative    T(F): 98 (01-26-19 @ 08:12), Max: 98.1 (01-25-19 @ 16:45)  HR: 111 (01-26-19 @ 08:12) (69 - 118)  BP: 125/92 (01-26-19 @ 08:12) (102/67 - 129/85)  RR: 16 (01-26-19 @ 08:12) (14 - 20)  SpO2: 95% (01-26-19 @ 08:12) (95% - 99%)  Wt(kg): --    PHYSICAL EXAM:  General: NAD  Respiratory: b/l air entry  Cardiovascular: S1 S2  Gastrointestinal: soft, mild distension  Extremities: edema        01-26    142  |  111<H>  |  61<H>  ----------------------------<  83  6.0<H>   |  12<L>  |  3.15<H>    Ca    8.8      26 Jan 2019 08:55  Phos  6.6     01-26  Mg     2.7     01-26    TPro  7.2  /  Alb  2.8<L>  /  TBili  0.5  /  DBili  x   /  AST  61<H>  /  ALT  42  /  AlkPhos  149<H>  01-25                          10.2   7.2   )-----------( 164      ( 26 Jan 2019 07:40 )             34.2       Potassium, Serum: 6.0 mmol/L (01-26 @ 08:55)  Blood Urea Nitrogen, Serum: 61 mg/dL (01-26 @ 08:55)  Calcium, Total Serum: 8.8 mg/dL (01-26 @ 08:55)  Potassium, Serum: 6.5 mmol/L (01-26 @ 07:40)      Creatinine, Serum: 3.15 (01-26 @ 08:55)  Creatinine, Serum: 3.24 (01-26 @ 07:40)  Creatinine, Serum: 2.69 (01-25 @ 14:20)        LIVER FUNCTIONS - ( 25 Jan 2019 14:20 )  Alb: 2.8 g/dL / Pro: 7.2 g/dL / ALK PHOS: 149 U/L / ALT: 42 U/L DA / AST: 61 U/L / GGT: x           CARDIAC MARKERS ( 25 Jan 2019 14:20 )  .019 ng/mL / x     / x     / x     / x                  I&O's Detail    26 Jan 2019 07:01  -  26 Jan 2019 14:23  --------------------------------------------------------  IN:    Oral Fluid: 400 mL  Total IN: 400 mL    OUT:  Total OUT: 0 mL    Total NET: 400 mL
Patient seen and examined.  Full consult dictated and will be available shortly.    Elderly female admitted with atrial flutter.  She was found on labs to be anemic.  She does have a history of gastric cancer (dx 1987) s/p partial gastric resection after which she has not been having any re-occurrence.  She denies abdominal pain, nausea or vomiting.  No reports of melena or BRBPR.  VSS.  Abdomen soft, nontender BS+    Impression: History of gastric cancer; no overt GI bleeding at this time    Plan:  1. Proton pump inhibitor daily - ulcers at anastomosis site always possibility  2. No contraindication from GI perspective to antiplatelet or anticoagulants in this patient  3. Monitor Hgb/Hct for now
CHIEF COMPLAINT:  Patient is a 89y old  Female who presents with a chief complaint of sent by PMD (25 Jan 2019 16:37)    HPI:  patient is 89/F comes to the ER, being sent by PMD for new atrial flutter. she was seen in the office for lower extremity edema, b/l, since one week. EKG was done in clinic which showed aflutter. denies dyspnea, chest pain, palpitations. She has history of gastric cancer diagnosed in 1990s and was told to be off aspirin. (25 Jan 2019 16:37)      PMH:   Gastric cancer      PSH:       FAMILY HISTORY:  FAMILY HISTORY:  No pertinent family history in first degree relatives      SOCIAL HISTORY:  Smoking:   Remote  Alcohol:     occasional      ALLERGIES:  No Known Allergies      Home Medications:  pramipexole 0.25 mg oral tablet: 1 tab(s) orally once a day (at bedtime) (25 Jan 2019 16:47)  Protonix 40 mg oral delayed release tablet: 1 tab(s) orally once a day (25 Jan 2019 16:47)      MEDICATIONS:  azithromycin  IVPB 500 milliGRAM(s) IV Intermittent once  furosemide   Injectable 40 milliGRAM(s) IV Push once  furosemide   Injectable 40 milliGRAM(s) IV Push daily  metoprolol tartrate 25 milliGRAM(s) Oral two times a day  pantoprazole    Tablet 40 milliGRAM(s) Oral before breakfast  pramipexole 0.25 milliGRAM(s) Oral at bedtime      REVIEW OF SYSTEMS:  CONSTITUTIONAL: No fever, weight loss, or fatigue  EYES: No eye pain, visual disturbances, or discharge  ENMT:  No difficulty hearing, tinnitus, vertigo; No sinus or throat pain  NECK: No pain or stiffness  BREASTS: No pain, masses, or nipple discharge  RESPIRATORY: No cough, wheezing, chills or hemoptysis; No shortness of breath  CARDIOVASCULAR: No chest pain, palpitations, dizziness+ edema  GASTROINTESTINAL: No abdominal or epigastric pain. No nausea, vomiting, or hematemesis; No diarrhea or constipation. No melena or hematochezia.  GENITOURINARY: No dysuria, frequency, hematuria, or incontinence  NEUROLOGICAL: No headaches, memory loss, loss of strength, numbness, or tremors  SKIN: No itching, burning, rashes, or lesions   LYMPH NODES: No enlarged glands  ENDOCRINE: No heat or cold intolerance; No hair loss  MUSCULOSKELETAL: No joint pain or swelling; No muscle, back, or extremity pain  PSYCHIATRIC: No depression, anxiety, mood swings, or difficulty sleeping  HEME/LYMPH: No easy bruising, or bleeding gums  ALLERGY AND IMMUNOLOGIC: No hives or eczema    PHYSICAL EXAM:  T(C): 36.7 (01-25-19 @ 16:45), Max: 36.7 (01-25-19 @ 16:45)  HR: 105 (01-25-19 @ 17:04) (86 - 118)  BP: 129/85 (01-25-19 @ 17:04) (114/69 - 129/89)  RR: 20 (01-25-19 @ 17:04) (14 - 20)  SpO2: 96% (01-25-19 @ 17:04) (96% - 99%)  Wt(kg): --    GENERAL: NAD, well-groomed, well-developed, lying flat comfortably  HEAD:  Atraumatic, Normocephalic  EYES: EOMI, conjunctiva and sclera clear  ENT: Moist mucous membranes,  NECK: Supple, No JVD, no bruits  CHEST/LUNG: Clear to ausculation and percussion bilaterally; No rales, rhonchi, wheezing, or rubs  HEART: Irregular, mildly tachycardic; No murmurs, rubs, or gallops PMI non displaced.  ABDOMEN: Soft, Nontender, Nondistended; Bowel sounds present  EXTREMITIES:  No clubbing, cyanosis, 1+ bilateral pretibial edema  SKIN: No rashes or lesions  NERVOUS SYSTEM:  Alert  No focal deficits    Cardiovascular Diagnostic Testing:    ECG:    AF, RVR        LABS:                        9.2    5.7   )-----------( 173      ( 25 Jan 2019 14:20 )             31.0     01-25    142  |  112<H>  |  54<H>  ----------------------------<  94  5.5<H>   |  20<L>  |  2.69<H>    Ca    8.6      25 Jan 2019 14:20    TPro  7.2  /  Alb  2.8<L>  /  TBili  0.5  /  DBili  x   /  AST  61<H>  /  ALT  42  /  AlkPhos  149<H>  01-25    PT/INR - ( 25 Jan 2019 14:20 )   PT: 11.8 sec;   INR: 1.05 ratio         PTT - ( 25 Jan 2019 14:20 )  PTT:28.4 sec  CARDIAC MARKERS ( 25 Jan 2019 14:20 )  .019 ng/mL / x     / x     / x     / x          Serum Pro-Brain Natriuretic Peptide: 35152 pg/mL (01-25 @ 14:20)      IMAGING:      < from: Xray Chest 1 View AP/PA (01.25.19 @ 14:51) >    EXAM:  XR CHEST AP OR PA 1V      PROCEDURE DATE:  01/25/2019        INTERPRETATION:  Single view chest    History chest pain    Comparison 12/10/17    There is moderate cardiomegaly, slightly worse since the prior exam.   There is interval development of moderate retrocardiac consolidation with   mild subsegmental left upper lobar consolidation. The right hemithorax is   clear. A small left effusion is suspected. There is no tyler central   edema.    IMPRESSION:    Findings consistent with leftbasilar atelectasis or pneumonia    < end of copied text >

## 2019-01-27 DIAGNOSIS — R74.0 NONSPECIFIC ELEVATION OF LEVELS OF TRANSAMINASE AND LACTIC ACID DEHYDROGENASE [LDH]: ICD-10-CM

## 2019-01-27 DIAGNOSIS — N18.9 CHRONIC KIDNEY DISEASE, UNSPECIFIED: ICD-10-CM

## 2019-01-27 DIAGNOSIS — N17.9 ACUTE KIDNEY FAILURE, UNSPECIFIED: ICD-10-CM

## 2019-01-27 LAB
ALBUMIN SERPL ELPH-MCNC: 2.5 G/DL — LOW (ref 3.3–5)
ALP SERPL-CCNC: 217 U/L — HIGH (ref 40–120)
ALT FLD-CCNC: 109 U/L DA — HIGH (ref 10–45)
ANION GAP SERPL CALC-SCNC: 13 MMOL/L — SIGNIFICANT CHANGE UP (ref 5–17)
AST SERPL-CCNC: 169 U/L — HIGH (ref 10–40)
BILIRUB SERPL-MCNC: 0.5 MG/DL — SIGNIFICANT CHANGE UP (ref 0.2–1.2)
BUN SERPL-MCNC: 68 MG/DL — HIGH (ref 7–23)
CALCIUM SERPL-MCNC: 8 MG/DL — LOW (ref 8.4–10.5)
CHLORIDE SERPL-SCNC: 109 MMOL/L — HIGH (ref 96–108)
CO2 SERPL-SCNC: 22 MMOL/L — SIGNIFICANT CHANGE UP (ref 22–31)
CREAT SERPL-MCNC: 3.59 MG/DL — HIGH (ref 0.5–1.3)
GLUCOSE SERPL-MCNC: 97 MG/DL — SIGNIFICANT CHANGE UP (ref 70–99)
POTASSIUM SERPL-MCNC: 4.2 MMOL/L — SIGNIFICANT CHANGE UP (ref 3.5–5.3)
POTASSIUM SERPL-SCNC: 4.2 MMOL/L — SIGNIFICANT CHANGE UP (ref 3.5–5.3)
PROT SERPL-MCNC: 6.3 G/DL — SIGNIFICANT CHANGE UP (ref 6–8.3)
SODIUM SERPL-SCNC: 144 MMOL/L — SIGNIFICANT CHANGE UP (ref 135–145)
TSH SERPL-MCNC: 1.41 UIU/ML — SIGNIFICANT CHANGE UP (ref 0.27–4.2)

## 2019-01-27 PROCEDURE — 99233 SBSQ HOSP IP/OBS HIGH 50: CPT | Mod: GC

## 2019-01-27 PROCEDURE — 99232 SBSQ HOSP IP/OBS MODERATE 35: CPT

## 2019-01-27 RX ADMIN — HEPARIN SODIUM 5000 UNIT(S): 5000 INJECTION INTRAVENOUS; SUBCUTANEOUS at 21:25

## 2019-01-27 RX ADMIN — SODIUM CHLORIDE 50 MILLILITER(S): 9 INJECTION, SOLUTION INTRAVENOUS at 21:24

## 2019-01-27 RX ADMIN — PANTOPRAZOLE SODIUM 40 MILLIGRAM(S): 20 TABLET, DELAYED RELEASE ORAL at 06:04

## 2019-01-27 RX ADMIN — HEPARIN SODIUM 5000 UNIT(S): 5000 INJECTION INTRAVENOUS; SUBCUTANEOUS at 06:04

## 2019-01-27 RX ADMIN — HEPARIN SODIUM 5000 UNIT(S): 5000 INJECTION INTRAVENOUS; SUBCUTANEOUS at 14:44

## 2019-01-27 RX ADMIN — Medication 650 MILLIGRAM(S): at 06:04

## 2019-01-27 RX ADMIN — PRAMIPEXOLE DIHYDROCHLORIDE 0.25 MILLIGRAM(S): 0.12 TABLET ORAL at 21:25

## 2019-01-27 RX ADMIN — Medication 650 MILLIGRAM(S): at 14:44

## 2019-01-27 RX ADMIN — Medication 25 MILLIGRAM(S): at 21:29

## 2019-01-27 RX ADMIN — Medication 650 MILLIGRAM(S): at 21:25

## 2019-01-27 NOTE — PROGRESS NOTE ADULT - ASSESSMENT
89 year old woman admitted for edema.  She has acute on chronic diastolic CHF, severe AS and TR, newly discovered atrial fibrillation.  As noted previously, history of gastric cancer not on anticoagulation.  AF is better rate controlled and she is hemodynamically stable.  CKD.    Plan  1. continue metoprolol for rate control of AF  2. diuresis as needed in setting of CKD  3. advance activity/ PT    discussed with patient and with Medicine team

## 2019-01-27 NOTE — PROGRESS NOTE ADULT - ASSESSMENT
·	SAMUEL, CKD 4  ·	Hyperkalemia  ·	hyperchloremic acidosis  ·	Anemia  ·	A Flutter    No retention. To continue current meds. Encourage PO intake. Potassium levels better. Monitor trend.   Monitor h/h trend. Cardiology follow up. Pt with baseline CKD. Will follow lytes and RF trend. D/w family at bedside.

## 2019-01-27 NOTE — PROGRESS NOTE ADULT - PROBLEM SELECTOR PLAN 4
Carmina on CKD-4 (baseline creatinine=1.8), creatinine trending up  US kidney and sono shows renal parenchyma disease with no hydronephrosis  Avoid nephrotoxic drugs, hold diuretics

## 2019-01-28 ENCOUNTER — TRANSCRIPTION ENCOUNTER (OUTPATIENT)
Age: 84
End: 2019-01-28

## 2019-01-28 VITALS
SYSTOLIC BLOOD PRESSURE: 111 MMHG | HEART RATE: 89 BPM | RESPIRATION RATE: 15 BRPM | DIASTOLIC BLOOD PRESSURE: 69 MMHG | OXYGEN SATURATION: 99 % | TEMPERATURE: 97 F

## 2019-01-28 DIAGNOSIS — D64.9 ANEMIA, UNSPECIFIED: ICD-10-CM

## 2019-01-28 PROBLEM — C16.9 MALIGNANT NEOPLASM OF STOMACH, UNSPECIFIED: Chronic | Status: ACTIVE | Noted: 2019-01-25

## 2019-01-28 LAB
ALBUMIN SERPL ELPH-MCNC: 2.4 G/DL — LOW (ref 3.3–5)
ALP SERPL-CCNC: 190 U/L — HIGH (ref 40–120)
ALT FLD-CCNC: 101 U/L DA — HIGH (ref 10–45)
ANION GAP SERPL CALC-SCNC: 10 MMOL/L — SIGNIFICANT CHANGE UP (ref 5–17)
AST SERPL-CCNC: 128 U/L — HIGH (ref 10–40)
BILIRUB SERPL-MCNC: 0.4 MG/DL — SIGNIFICANT CHANGE UP (ref 0.2–1.2)
BUN SERPL-MCNC: 72 MG/DL — HIGH (ref 7–23)
CALCIUM SERPL-MCNC: 7.8 MG/DL — LOW (ref 8.4–10.5)
CHLORIDE SERPL-SCNC: 108 MMOL/L — SIGNIFICANT CHANGE UP (ref 96–108)
CO2 SERPL-SCNC: 23 MMOL/L — SIGNIFICANT CHANGE UP (ref 22–31)
CREAT SERPL-MCNC: 3.63 MG/DL — HIGH (ref 0.5–1.3)
GLUCOSE SERPL-MCNC: 92 MG/DL — SIGNIFICANT CHANGE UP (ref 70–99)
HCT VFR BLD CALC: 28.8 % — LOW (ref 34.5–45)
HGB BLD-MCNC: 8.8 G/DL — LOW (ref 11.5–15.5)
MCHC RBC-ENTMCNC: 28.5 PG — SIGNIFICANT CHANGE UP (ref 27–34)
MCHC RBC-ENTMCNC: 30.4 GM/DL — LOW (ref 32–36)
MCV RBC AUTO: 93.8 FL — SIGNIFICANT CHANGE UP (ref 80–100)
PLATELET # BLD AUTO: 176 K/UL — SIGNIFICANT CHANGE UP (ref 150–400)
POTASSIUM SERPL-MCNC: 3.9 MMOL/L — SIGNIFICANT CHANGE UP (ref 3.5–5.3)
POTASSIUM SERPL-SCNC: 3.9 MMOL/L — SIGNIFICANT CHANGE UP (ref 3.5–5.3)
PROT SERPL-MCNC: 6 G/DL — SIGNIFICANT CHANGE UP (ref 6–8.3)
RBC # BLD: 3.08 M/UL — LOW (ref 3.8–5.2)
RBC # FLD: 15.8 % — HIGH (ref 10.3–14.5)
SODIUM SERPL-SCNC: 141 MMOL/L — SIGNIFICANT CHANGE UP (ref 135–145)
WBC # BLD: 6.4 K/UL — SIGNIFICANT CHANGE UP (ref 3.8–10.5)
WBC # FLD AUTO: 6.4 K/UL — SIGNIFICANT CHANGE UP (ref 3.8–10.5)

## 2019-01-28 PROCEDURE — 82607 VITAMIN B-12: CPT

## 2019-01-28 PROCEDURE — 76770 US EXAM ABDO BACK WALL COMP: CPT

## 2019-01-28 PROCEDURE — 99232 SBSQ HOSP IP/OBS MODERATE 35: CPT

## 2019-01-28 PROCEDURE — 80048 BASIC METABOLIC PNL TOTAL CA: CPT

## 2019-01-28 PROCEDURE — 93306 TTE W/DOPPLER COMPLETE: CPT

## 2019-01-28 PROCEDURE — 97163 PT EVAL HIGH COMPLEX 45 MIN: CPT

## 2019-01-28 PROCEDURE — 83605 ASSAY OF LACTIC ACID: CPT

## 2019-01-28 PROCEDURE — 83735 ASSAY OF MAGNESIUM: CPT

## 2019-01-28 PROCEDURE — 85610 PROTHROMBIN TIME: CPT

## 2019-01-28 PROCEDURE — 83540 ASSAY OF IRON: CPT

## 2019-01-28 PROCEDURE — 84132 ASSAY OF SERUM POTASSIUM: CPT

## 2019-01-28 PROCEDURE — 85730 THROMBOPLASTIN TIME PARTIAL: CPT

## 2019-01-28 PROCEDURE — 93005 ELECTROCARDIOGRAM TRACING: CPT

## 2019-01-28 PROCEDURE — 85027 COMPLETE CBC AUTOMATED: CPT

## 2019-01-28 PROCEDURE — 82746 ASSAY OF FOLIC ACID SERUM: CPT

## 2019-01-28 PROCEDURE — 83550 IRON BINDING TEST: CPT

## 2019-01-28 PROCEDURE — 80053 COMPREHEN METABOLIC PANEL: CPT

## 2019-01-28 PROCEDURE — 82728 ASSAY OF FERRITIN: CPT

## 2019-01-28 PROCEDURE — 71045 X-RAY EXAM CHEST 1 VIEW: CPT

## 2019-01-28 PROCEDURE — 84484 ASSAY OF TROPONIN QUANT: CPT

## 2019-01-28 PROCEDURE — 85045 AUTOMATED RETICULOCYTE COUNT: CPT

## 2019-01-28 PROCEDURE — 99285 EMERGENCY DEPT VISIT HI MDM: CPT | Mod: 25

## 2019-01-28 PROCEDURE — 76700 US EXAM ABDOM COMPLETE: CPT | Mod: 26

## 2019-01-28 PROCEDURE — 93970 EXTREMITY STUDY: CPT

## 2019-01-28 PROCEDURE — 36000 PLACE NEEDLE IN VEIN: CPT

## 2019-01-28 PROCEDURE — 99239 HOSP IP/OBS DSCHRG MGMT >30: CPT

## 2019-01-28 PROCEDURE — 83880 ASSAY OF NATRIURETIC PEPTIDE: CPT

## 2019-01-28 PROCEDURE — 84100 ASSAY OF PHOSPHORUS: CPT

## 2019-01-28 PROCEDURE — 84443 ASSAY THYROID STIM HORMONE: CPT

## 2019-01-28 PROCEDURE — 76700 US EXAM ABDOM COMPLETE: CPT

## 2019-01-28 RX ORDER — METOPROLOL TARTRATE 50 MG
1 TABLET ORAL
Qty: 90 | Refills: 0 | OUTPATIENT
Start: 2019-01-28 | End: 2019-02-26

## 2019-01-28 RX ORDER — SODIUM BICARBONATE 1 MEQ/ML
1 SYRINGE (ML) INTRAVENOUS
Qty: 45 | Refills: 0 | OUTPATIENT
Start: 2019-01-28 | End: 2019-02-11

## 2019-01-28 RX ADMIN — PANTOPRAZOLE SODIUM 40 MILLIGRAM(S): 20 TABLET, DELAYED RELEASE ORAL at 05:51

## 2019-01-28 RX ADMIN — Medication 650 MILLIGRAM(S): at 05:51

## 2019-01-28 RX ADMIN — Medication 25 MILLIGRAM(S): at 05:51

## 2019-01-28 RX ADMIN — HEPARIN SODIUM 5000 UNIT(S): 5000 INJECTION INTRAVENOUS; SUBCUTANEOUS at 05:51

## 2019-01-28 RX ADMIN — Medication 650 MILLIGRAM(S): at 14:11

## 2019-01-28 RX ADMIN — Medication 25 MILLIGRAM(S): at 14:11

## 2019-01-28 NOTE — PROGRESS NOTE ADULT - PROBLEM SELECTOR PROBLEM 2
Acute congestive heart failure, unspecified heart failure type
Anemia
Acute congestive heart failure, unspecified heart failure type

## 2019-01-28 NOTE — PROGRESS NOTE ADULT - ATTENDING COMMENTS
Patient seen and examined; agree with assessment and plan.  She continues to do well; no acute complaints. VSS.  Abdomen soft, nontender BS+    Plan: Discharge planning.  No contraindication to antiplatelet or anticoagulation from GI perspective.  Diet as tolerated.
I have personally seen and examined patient on the above date.  I discussed the case with ULICES ALSTON and I agree with findings and plan as detailed per note above, which I have amended where appropriate.    89 yo f admitted 1-25-19 pmh ckd stage 4, gastric cancer presents with new onset atrial flutter    1. new onset atrial flutter: rate controlled c/w lopressor patient refusing a/c risk and benefits explained  2. hyperkalemia: improved   3. liat on ckd stage 4 baseline creatinine 1.8 pt now on sodium bicarb f/u renal   4. anemia of renal disease renal consult placed start ferrous sulfate  5. dvt ppx: heparin subq  6. dispo: pt consult placed recommened home w home pt  7. hypernatremia: improved   8. transaminitis: get abdominal ultrasound   d/c planning consider d/c in am

## 2019-01-28 NOTE — PROGRESS NOTE ADULT - ASSESSMENT
Assessment and Plan:   · Assessment		  89 year old woman admitted for edema.  She has acute on chronic diastolic CHF, severe AS and TR, newly discovered atrial fibrillation.  As noted previously, history of gastric cancer not on anticoagulation.  AF is better rate controlled and she is hemodynamically stable.  CKD.    Plan  1. continue metoprolol for rate control of AF  2. diuresis as needed in setting of CKD  3. advance activity/ PT       Problem/Plan - 1:  ·  Problem: Atrial fibrillation, unspecified type.      Problem/Plan - 2:  ·  Problem: Acute congestive heart failure, unspecified heart failure type.

## 2019-01-28 NOTE — DISCHARGE NOTE ADULT - HOSPITAL COURSE
89 yo f admitted 1-25-19 pmh ckd stage 4, gastric cancer presents with new onset atrial flutter    1. new onset atrial flutter: rate controlled c/w lopressor patient refusing a/c risk and benefits explained  2. hyperkalemia: improved   3. liat on ckd stage 4 baseline creatinine 1.8 pt now on sodium bicarb f/u renal   4. anemia of renal disease renal consult placed start ferrous sulfate  5. dvt ppx: heparin subq  6. dispo: pt consult placed recommened home w home pt  7. hypernatremia: improved   8. transaminitis: secondary to passive congestion restart lasix when liat improves   d/c today  dr causey informed via phone call

## 2019-01-28 NOTE — PROGRESS NOTE ADULT - PROBLEM SELECTOR PLAN 2
Continue PPI  No contraindication from GI perspective to antiplatelet or anticoagulants in this patient  Monitor Hgb/Hct   Follow up as outpatient with Dr. Bobby
Acute on chronic diastolic CHF 2/2 valvular disease - Severe AS and TR  Cont. ASA, BB, no ACEI/ARB 2/2 CKD-4, no statin for now 2/2 transaminitis   Lasix on hold 2/2 CKD   Cardiology following

## 2019-01-28 NOTE — PROGRESS NOTE ADULT - REASON FOR ADMISSION
chf/af
sent by PMD
Atrial flutter
sent by PMD for lower extremity edema
sent by PMD
sent by PMD

## 2019-01-28 NOTE — DISCHARGE NOTE ADULT - CARE PLAN
Principal Discharge DX:	Atrial fibrillation, unspecified type  Goal:	rate controlled  Assessment and plan of treatment:	out patient pcp f/u

## 2019-01-28 NOTE — DISCHARGE NOTE ADULT - MEDICATION SUMMARY - MEDICATIONS TO TAKE
I will START or STAY ON the medications listed below when I get home from the hospital:    sodium bicarbonate 650 mg oral tablet  -- 1 tab(s) by mouth 3 times a day  -- Indication: For ckd    pramipexole 0.25 mg oral tablet  -- 1 tab(s) by mouth once a day (at bedtime)  -- Indication: For parkinsons    metoprolol tartrate 25 mg oral tablet  -- 1 tab(s) by mouth every 8 hours  -- Indication: For Aflutter    Protonix 40 mg oral delayed release tablet  -- 1 tab(s) by mouth once a day  -- Indication: For Gerd

## 2019-01-28 NOTE — PROGRESS NOTE ADULT - ASSESSMENT
89 y/o female with PMH of gastric Ca (1987), partial gastric resection, now admitted with atrial flutter, b/l lower extremity edema and was found to be anemic during workup. VSS. Abdomen soft non-tender. +BM today.     History of gastric cancer; no overt GI bleeding at this time

## 2019-01-28 NOTE — PROGRESS NOTE ADULT - PROBLEM SELECTOR PLAN 1
Follow up with Dr. Bobby as outpatient for w/u
New onset Aflutter/Afib - rate controlled with lopressor  Pt. with previous Hx of gastric CA, cleared by GI - Dr. Bobby for anticoagulation, however pt. refuses, risk and benefits explained  Cardiac consult - Dr. John appreciated: continue rate control with BB

## 2019-01-28 NOTE — PROGRESS NOTE ADULT - SUBJECTIVE AND OBJECTIVE BOX
INTERVAL HPI/OVERNIGHT EVENTS:  HPI:  89 y/o female with PMH of gastric Ca (), partial gastric resection, now admitted with atrial flutter, b/l lower extremity edema and was found to be anemic during workup . Patient seen and examined at bedside. She denies abdominal pain, nausea or vomiting.  No reports of melena or BRBPR.  She states " I want to go home."     MEDICATIONS  (STANDING):  heparin  Injectable 5000 Unit(s) SubCutaneous every 8 hours  metoprolol tartrate 25 milliGRAM(s) Oral every 8 hours  pantoprazole    Tablet 40 milliGRAM(s) Oral before breakfast  pramipexole 0.25 milliGRAM(s) Oral at bedtime  sodium bicarbonate 650 milliGRAM(s) Oral three times a day  sodium chloride 0.45%. 1000 milliLiter(s) (50 mL/Hr) IV Continuous <Continuous>    MEDICATIONS  (PRN):      Allergies    No Known Allergies    Intolerances    PHYSICAL EXAM:   Vital Signs:  Vital Signs Last 24 Hrs  T(C): 36.3 (2019 16:47), Max: 37 (2019 21:36)  T(F): 97.3 (2019 16:47), Max: 98.6 (2019 21:36)  HR: 89 (2019 16:47) (89 - 98)  BP: 111/69 (2019 16:47) (97/54 - 111/69)  BP(mean): --  RR: 15 (2019 16:47) (15 - 16)  SpO2: 99% (2019 16:47) (95% - 99%)  Daily     Daily Weight in k.8 (2019 10:03)I&O's Summary    2019 07:  -  2019 07:00  --------------------------------------------------------  IN: 200 mL / OUT: 0 mL / NET: 200 mL    2019 07:01  -  2019 16:51  --------------------------------------------------------  IN: 500 mL / OUT: 0 mL / NET: 500 mL      GENERAL:  Appears stated age, no distress  HEENT:  NC/AT,  conjunctivae clear and pink, no thyromegaly, nodules, adenopathy, no JVD, sclera -anicteric  CHEST:  Full & symmetric excursion, no increased effort, breath sounds clear  HEART:  Regular rhythm, S1, S2, no murmur, no edema  ABDOMEN:  Soft, non-tender, non-distended, normoactive bowel sounds,  no masses ,no hepato-splenomegaly, no signs of chronic liver disease  EXTEREMITIES:  no cyanosis,clubbing or edema  SKIN:  No rash, warm/dry  NEURO:  Alert, oriented, no asterixis, no tremor, no encephalopathy      LABS:                        8.8    6.4   )-----------( 176      ( 2019 06:10 )             28.8         141  |  108  |  72<H>  ----------------------------<  92  3.9   |  23  |  3.63<H>    Ca    7.8<L>      2019 06:10    TPro  6.0  /  Alb  2.4<L>  /  TBili  0.4  /  DBili  x   /  AST  128<H>  /  ALT  101<H>  /  AlkPhos  190<H>          amylase   lipase  RADIOLOGY & ADDITIONAL TESTS:
Patient is a 89y old  Female who presents with a chief complaint of sent by PMD (27 Jan 2019 14:53)      Patient seen and examined at bedside.    ALLERGIES:  No Known Allergies    MEDICATIONS:  heparin  Injectable 5000 Unit(s) SubCutaneous every 8 hours  metoprolol tartrate 25 milliGRAM(s) Oral every 8 hours  pantoprazole    Tablet 40 milliGRAM(s) Oral before breakfast  pramipexole 0.25 milliGRAM(s) Oral at bedtime  sodium bicarbonate 650 milliGRAM(s) Oral three times a day  sodium chloride 0.45%. 1000 milliLiter(s) IV Continuous <Continuous>    Vital Signs Last 24 Hrs  T(F): 97.5 (28 Jan 2019 09:37), Max: 98.6 (27 Jan 2019 21:36)  HR: 98 (28 Jan 2019 09:37) (91 - 98)  BP: 97/54 (28 Jan 2019 09:37) (97/54 - 110/63)  RR: 15 (28 Jan 2019 09:37) (15 - 16)  SpO2: 95% (28 Jan 2019 09:37) (95% - 100%)  I&O's Summary    27 Jan 2019 07:01  -  28 Jan 2019 07:00  --------------------------------------------------------  IN: 200 mL / OUT: 0 mL / NET: 200 mL        PHYSICAL EXAM:  General: NAD, A/O x 3  ENT: MMM  Neck: Supple, No JVD  Lungs: Clear to auscultation bilaterally  Cardio: RRR, S1/S2, No murmurs  Abdomen: Soft, Nontender, Nondistended; Bowel sounds present  Extremities: No cyanosis, No edema    LABS:                        8.8    6.4   )-----------( 176      ( 28 Jan 2019 06:10 )             28.8 01-28    141  |  108  |  72  ----------------------------<  92  3.9   |  23  |  3.63    Ca    7.8      28 Jan 2019 06:10  Phos  6.6     01-26  Mg     2.7     01-26    TPro  6.0  /  Alb  2.4  /  TBili  0.4  /  DBili  x   /  AST  128  /  ALT  101  /  AlkPhos  190  01-28    eGFR if Non African American: 10 mL/min/1.73M2 (01-28-19 @ 06:10)  eGFR if African American: 12 mL/min/1.73M2 (01-28-19 @ 06:10)    PT/INR - ( 25 Jan 2019 14:20 )   PT: 11.8 sec;   INR: 1.05 ratio         PTT - ( 25 Jan 2019 14:20 )  PTT:28.4 sec  Lactate, Blood: 1.0 mmol/L (01-25 @ 16:39)    CARDIAC MARKERS ( 25 Jan 2019 14:20 )  .019 ng/mL / x     / x     / x     / x            TSH 1.41   TSH with FT4 reflex --  Total T3 --        CAPILLARY BLOOD GLUCOSE                RADIOLOGY & ADDITIONAL TESTS:  < from: US Abdomen Complete (01.28.19 @ 08:42) >  Findings suggesting passive hepatic congestion. Trace ascites. Left   pleural effusion. Chronic cortical renal disease                  ELISABET DRAPER M.D., ATTENDING RADIOLOGIST  This document has been electronically signed. Jan 28 2019  9:15AM              < end of copied text >    Care Discussed with Consultants/Other Providers:
Follow up for: af/chf    SUBJ:  no complaints    PMH  Gastric cancer      MEDICATIONS  (STANDING):  heparin  Injectable 5000 Unit(s) SubCutaneous every 8 hours  metoprolol tartrate 25 milliGRAM(s) Oral every 8 hours  pantoprazole    Tablet 40 milliGRAM(s) Oral before breakfast  pramipexole 0.25 milliGRAM(s) Oral at bedtime  sodium bicarbonate 650 milliGRAM(s) Oral three times a day  sodium chloride 0.45%. 1000 milliLiter(s) (50 mL/Hr) IV Continuous <Continuous>    MEDICATIONS  (PRN):        PHYSICAL EXAM:  Vital Signs Last 24 Hrs  T(C): 36.4 (28 Jan 2019 09:37), Max: 37 (27 Jan 2019 21:36)  T(F): 97.5 (28 Jan 2019 09:37), Max: 98.6 (27 Jan 2019 21:36)  HR: 98 (28 Jan 2019 09:37) (91 - 98)  BP: 97/54 (28 Jan 2019 09:37) (97/54 - 109/69)  BP(mean): --  RR: 15 (28 Jan 2019 09:37) (15 - 16)  SpO2: 95% (28 Jan 2019 09:37) (95% - 100%)    GENERAL: NAD, well-groomed, well-developed  HEAD:  Atraumatic, Normocephalic  EYES: EOMI, PERRLA, conjunctiva and sclera clear  ENT: Moist mucous membranes,  NECK: Supple, No JVD, no bruits  CHEST/LUNG: Clear to percussion bilaterally; No rales, rhonchi, wheezing, or rubs  HEART: Regular rate and rhythm; No murmurs, rubs, or gallops PMI non displaced.  ABDOMEN: Soft, Nontender, Nondistended; Bowel sounds present  EXTREMITIES:  2+ Peripheral Pulses, No clubbing, cyanosis, or edema  SKIN: No rashes or lesions  NERVOUS SYSTEM:  Alert & Oriented X3, Good concentration; Motor Strength 5/5 B/L upper and lower extremities; DTRs 2+ intact and symmetric      TELEMETRY:  af,pvcs    ECG:  < from: 12 Lead ECG (01.25.19 @ 14:07) >  Diagnosis Line Atrial flutter with variable AV block with premature ventricular or aberrantly conducted complexes  Low voltage QRS  Poor R wave progression  Abnormal ECG  When compared with ECG of 10-DEC-2017 16:49,  Atrial flutter has replaced Sinus rhythm  Vent. rate has increased BY  44 BPM    < end of copied text >      LABS:                        8.8    6.4   )-----------( 176      ( 28 Jan 2019 06:10 )             28.8     01-28    141  |  108  |  72<H>  ----------------------------<  92  3.9   |  23  |  3.63<H>    Ca    7.8<L>      28 Jan 2019 06:10    TPro  6.0  /  Alb  2.4<L>  /  TBili  0.4  /  DBili  x   /  AST  128<H>  /  ALT  101<H>  /  AlkPhos  190<H>  01-28            I&O's Summary    27 Jan 2019 07:01  -  28 Jan 2019 07:00  --------------------------------------------------------  IN: 200 mL / OUT: 0 mL / NET: 200 mL      BNP    RADIOLOGY & ADDITIONAL STUDIES:      ASSESMENT AND PLAN:    Continue current management
Patient is a 89y old  Female who presents with a chief complaint of sent by PMD (25 Jan 2019 17:05)      Patient seen and examined at bedside.    ALLERGIES:  No Known Allergies    MEDICATIONS:  furosemide   Injectable 40 milliGRAM(s) IV Push daily  metoprolol tartrate 25 milliGRAM(s) Oral two times a day  pantoprazole    Tablet 40 milliGRAM(s) Oral before breakfast  pramipexole 0.25 milliGRAM(s) Oral at bedtime    Vital Signs Last 24 Hrs  T(F): 97.6 (26 Jan 2019 06:27), Max: 98.1 (25 Jan 2019 16:45)  HR: 69 (25 Jan 2019 21:11) (69 - 118)  BP: 108/74 (26 Jan 2019 06:27) (102/67 - 129/89)  RR: 15 (26 Jan 2019 06:27) (14 - 20)  SpO2: 97% (26 Jan 2019 06:27) (95% - 99%)  I&O's Summary      PHYSICAL EXAM:  General: NAD,  ENT: MMM  Neck: Supple, No JVD  Lungs: Clear to auscultation bilaterally  Cardio: RRR, S1/S2, No murmurs  Abdomen: Soft, Nontender, Nondistended; Bowel sounds present  Extremities: No cyanosis, No edema    LABS:                        10.2   7.2   )-----------( 164      ( 26 Jan 2019 07:40 )             34.2     01-26    143  |  113  |  61  ----------------------------<  66  6.5   |  12  |  3.24    Ca    9.2      26 Jan 2019 07:40  Phos  6.6     01-26  Mg     2.7     01-26    TPro  7.2  /  Alb  2.8  /  TBili  0.5  /  DBili  x   /  AST  61  /  ALT  42  /  AlkPhos  149  01-25    eGFR if Non African American: 12 mL/min/1.73M2 (01-26-19 @ 07:40)  eGFR if : 14 mL/min/1.73M2 (01-26-19 @ 07:40)    PT/INR - ( 25 Jan 2019 14:20 )   PT: 11.8 sec;   INR: 1.05 ratio         PTT - ( 25 Jan 2019 14:20 )  PTT:28.4 sec  Lactate, Blood: 1.0 mmol/L (01-25 @ 16:39)    CARDIAC MARKERS ( 25 Jan 2019 14:20 )  .019 ng/mL / x     / x     / x     / x                  CAPILLARY BLOOD GLUCOSE                RADIOLOGY & ADDITIONAL TESTS:    Care Discussed with Consultants/Other Providers:
Patient is a 89y old  Female who presents with a chief complaint of sent by PMD (27 Jan 2019 11:15)      Patient seen and examined at bedside, no events overnight, no complaints.     ALLERGIES:  No Known Allergies    MEDICATIONS:  heparin  Injectable 5000 Unit(s) SubCutaneous every 8 hours  metoprolol tartrate 25 milliGRAM(s) Oral every 8 hours  pantoprazole    Tablet 40 milliGRAM(s) Oral before breakfast  pramipexole 0.25 milliGRAM(s) Oral at bedtime  sodium bicarbonate 650 milliGRAM(s) Oral three times a day  sodium chloride 0.45%. 1000 milliLiter(s) IV Continuous <Continuous>    Vital Signs Last 24 Hrs  T(C): 36.3 (27 Jan 2019 09:14), Max: 36.4 (26 Jan 2019 20:53)  T(F): 97.4 (27 Jan 2019 09:14), Max: 97.6 (26 Jan 2019 20:53)  HR: 95 (27 Jan 2019 10:24) (63 - 95)  BP: 110/63 (27 Jan 2019 10:24) (98/61 - 118/70)  BP(mean): --  RR: 15 (27 Jan 2019 09:14) (15 - 16)  SpO2: 97% (27 Jan 2019 10:24) (93% - 97%)  I&O's Summary    26 Jan 2019 07:01  -  27 Jan 2019 07:00  --------------------------------------------------------  IN: 600 mL / OUT: 300 mL / NET: 300 mL          PAST MEDICAL & SURGICAL HISTORY:  Gastric cancer  No significant past surgical history      Home Medications:  pramipexole 0.25 mg oral tablet: 1 tab(s) orally once a day (at bedtime) (25 Jan 2019 16:47)  Protonix 40 mg oral delayed release tablet: 1 tab(s) orally once a day (25 Jan 2019 16:47)      PHYSICAL EXAM:  General: NAD, A/O x3  ENT: MMM  Neck: Supple, No JVD  Lungs: Clear to percussion bilaterally  Cardio: RRR, S1/S2, II/VI systolic murmur   Abdomen: Soft, Nontender, Nondistended; Bowel sounds present  Extremities: No clubbing, cyanosis, or edema    LABS:                        10.2   7.2   )-----------( 164      ( 26 Jan 2019 07:40 )             34.2     01-27    144  |  109  |  68  ----------------------------<  97  4.2   |  22  |  3.59    Ca    8.0      27 Jan 2019 07:20  Phos  6.6     01-26  Mg     2.7     01-26    TPro  6.3  /  Alb  2.5  /  TBili  0.5  /  DBili  x   /  AST  169  /  ALT  109  /  AlkPhos  217  01-27    PT/INR - ( 25 Jan 2019 14:20 )   PT: 11.8 sec;   INR: 1.05 ratio         PTT - ( 25 Jan 2019 14:20 )  PTT:28.4 sec  Lactate, Blood: 1.0 mmol/L (01-25 @ 16:39)      TSH 1.41   TSH with FT4 reflex --  Total T3 --      RADIOLOGY & ADDITIONAL TESTS: < from: US Kidney and Bladder (01.26.19 @ 11:45) >    IMPRESSION:     Renal parenchymal disease without evidence of hydronephrosis.    Ascites and bilateral pleural effusions.    < end of copied text >      Care Discussed with Consultants/Other Providers: Hospitalist, Cardiology - Dr. John
Patient is a 89y old  Female who presents with a chief complaint of sent by PMD (27 Jan 2019 11:53)      Patient seen in follow up for     PAST MEDICAL HISTORY:  Gastric cancer    MEDICATIONS  (STANDING):  heparin  Injectable 5000 Unit(s) SubCutaneous every 8 hours  metoprolol tartrate 25 milliGRAM(s) Oral every 8 hours  pantoprazole    Tablet 40 milliGRAM(s) Oral before breakfast  pramipexole 0.25 milliGRAM(s) Oral at bedtime  sodium bicarbonate 650 milliGRAM(s) Oral three times a day  sodium chloride 0.45%. 1000 milliLiter(s) (50 mL/Hr) IV Continuous <Continuous>    MEDICATIONS  (PRN):    T(C): 36.3 (01-27-19 @ 09:14), Max: 36.7 (01-26-19 @ 08:12)  HR: 91 (01-27-19 @ 11:15) (63 - 111)  BP: 100/70 (01-27-19 @ 11:15) (98/61 - 125/92)  RR: 15 (01-27-19 @ 09:14) (15 - 16)  SpO2: 97% (01-27-19 @ 10:24) (93% - 97%)  Wt(kg): --  I&O's Detail    26 Jan 2019 07:01  -  27 Jan 2019 07:00  --------------------------------------------------------  IN:    Oral Fluid: 400 mL    sodium chloride 0.45%.: 200 mL  Total IN: 600 mL    OUT:    Voided: 300 mL  Total OUT: 300 mL    Total NET: 300 mL      27 Jan 2019 07:01  -  27 Jan 2019 14:53  --------------------------------------------------------  IN:    Oral Fluid: 200 mL  Total IN: 200 mL    OUT:  Total OUT: 0 mL    Total NET: 200 mL          PHYSICAL EXAM:  General: NAD  Respiratory: b/l air entry  Cardiovascular: S1 S2  Gastrointestinal: soft  Extremities:                            10.2   7.2   )-----------( 164      ( 26 Jan 2019 07:40 )             34.2     01-27    144  |  109<H>  |  68<H>  ----------------------------<  97  4.2   |  22  |  3.59<H>    Ca    8.0<L>      27 Jan 2019 07:20  Phos  6.6     01-26  Mg     2.7     01-26    TPro  6.3  /  Alb  2.5<L>  /  TBili  0.5  /  DBili  x   /  AST  169<H>  /  ALT  109<H>  /  AlkPhos  217<H>  01-27        LIVER FUNCTIONS - ( 27 Jan 2019 07:20 )  Alb: 2.5 g/dL / Pro: 6.3 g/dL / ALK PHOS: 217 U/L / ALT: 109 U/L DA / AST: 169 U/L / GGT: x               Sodium, Serum: 144 (01-27 @ 07:20)  Sodium, Serum: 147 (01-26 @ 16:08)  Sodium, Serum: 142 (01-26 @ 08:55)  Sodium, Serum: 143 (01-26 @ 07:40)    Creatinine, Serum: 3.59 (01-27 @ 07:20)  Creatinine, Serum: 3.53 (01-26 @ 16:08)  Creatinine, Serum: 3.15 (01-26 @ 08:55)  Creatinine, Serum: 3.24 (01-26 @ 07:40)  Creatinine, Serum: 2.69 (01-25 @ 14:20)    Potassium, Serum: 4.2 (01-27 @ 07:20)  Potassium, Serum: 5.2 (01-26 @ 16:08)  Potassium, Serum: 6.0 (01-26 @ 08:55)  Potassium, Serum: 6.5 (01-26 @ 07:40)    Hemoglobin: 10.2 (01-26 @ 07:40)  Hemoglobin: 9.2 (01-25 @ 14:20)
SUBJ:  Patient is a 89y old  Female who presents with a chief complaint of sent by PMD for edema (26 Jan 2019 17:04)  patient seen and examined  sitting in chair... denies any complaints    PAST MEDICAL & SURGICAL HISTORY:  Gastric cancer  No significant past surgical history      MEDICATIONS  (STANDING):  heparin  Injectable 5000 Unit(s) SubCutaneous every 8 hours  metoprolol tartrate 25 milliGRAM(s) Oral every 8 hours  pantoprazole    Tablet 40 milliGRAM(s) Oral before breakfast  pramipexole 0.25 milliGRAM(s) Oral at bedtime  sodium bicarbonate 650 milliGRAM(s) Oral three times a day  sodium chloride 0.45%. 1000 milliLiter(s) (50 mL/Hr) IV Continuous <Continuous>    MEDICATIONS  (PRN):          Vital Signs Last 24 Hrs  T(C): 36.3 (27 Jan 2019 09:14), Max: 36.4 (26 Jan 2019 20:53)  T(F): 97.4 (27 Jan 2019 09:14), Max: 97.6 (26 Jan 2019 20:53)  HR: 95 (27 Jan 2019 10:24) (63 - 95)  BP: 110/63 (27 Jan 2019 10:24) (98/61 - 118/70)  BP(mean): --  RR: 15 (27 Jan 2019 09:14) (15 - 16)  SpO2: 97% (27 Jan 2019 10:24) (93% - 97%)    REVIEW OF SYSTEMS:  CONSTITUTIONAL: No fever, weight loss, or fatigue  RESPIRATORY: No cough, wheezing, chills or hemoptysis; No shortness of breath  CARDIOVASCULAR: No chest pain or chest pressure.  No shortness of breath or dyspnea on exertion.  No palpitations, dizziness, light headedness, syncope or near syncope. NEUROLOGICAL: No headaches, memory loss, loss of strength, numbness, or tremors      PHYSICAL EXAM  Constitutional:  frail elderly woman. No acute distress  HEENT: normocephalic, atraumatic.  PERRLA. EOMI  Neck : No JVD. no carotid bruits  Lungs:  decreased breath sounds bilateral   Heart:  S1 and S2. No S3, S4. II/VI systolic murmur.  Abdomen:  soft, non tender.  Extremities: No clubbing, cyanoisis or edema  Nuerologic:  A+O x 3. No focal deficits  Skin:  no rashes        LABS:                        10.2   7.2   )-----------( 164      ( 26 Jan 2019 07:40 )             34.2     01-27    144  |  109<H>  |  68<H>  ----------------------------<  97  4.2   |  22  |  3.59<H>    Ca    8.0<L>      27 Jan 2019 07:20  Phos  6.6     01-26  Mg     2.7     01-26    TPro  6.3  /  Alb  2.5<L>  /  TBili  0.5  /  DBili  x   /  AST  169<H>  /  ALT  109<H>  /  AlkPhos  217<H>  01-27    CARDIAC MARKERS ( 25 Jan 2019 14:20 )  .019 ng/mL / x     / x     / x     / x          CARDIAC MARKERS ( 25 Jan 2019 14:20 )  .019 ng/mL / x     / x     / x     / x          heparin  Injectable 5000 Unit(s) SubCutaneous every 8 hours  metoprolol tartrate 25 milliGRAM(s) Oral every 8 hours  pantoprazole    Tablet 40 milliGRAM(s) Oral before breakfast  pramipexole 0.25 milliGRAM(s) Oral at bedtime  sodium bicarbonate 650 milliGRAM(s) Oral three times a day  sodium chloride 0.45%. 1000 milliLiter(s) IV Continuous <Continuous>    I&O's Summary    26 Jan 2019 07:01  -  27 Jan 2019 07:00  --------------------------------------------------------  IN: 600 mL / OUT: 300 mL / NET: 300 mL    < from: 12 Lead ECG (01.25.19 @ 14:07) >  Atrial flutter with variable AV block with premature ventricular or aberrantly conducted complexes  Low voltage QRS  Poor R wave progression  Abnormal ECG  When compared with ECG of 10-DEC-2017 16:49,  Atrial flutter has replaced Sinus rhythm  Vent. rate has increased BY  44 BPM    < end of copied text >
SUBJ:  Patient is a 89y old  Female who presents with a chief complaint of sent by PMD for lower extremity edema (26 Jan 2019 08:57)  patient examined and chart reviewed  she is in bed, denies palpitations, shortness of breath or chest pain     PAST MEDICAL & SURGICAL HISTORY:  Gastric cancer  No significant past surgical history      MEDICATIONS  (STANDING):  heparin  Injectable 5000 Unit(s) SubCutaneous every 8 hours  metoprolol tartrate 25 milliGRAM(s) Oral two times a day  pantoprazole    Tablet 40 milliGRAM(s) Oral before breakfast  pramipexole 0.25 milliGRAM(s) Oral at bedtime  sodium bicarbonate 650 milliGRAM(s) Oral three times a day  sodium chloride 0.9%. 1000 milliLiter(s) (75 mL/Hr) IV Continuous <Continuous>  sodium polystyrene sulfonate Suspension 30 Gram(s) Oral once    MEDICATIONS  (PRN):          Vital Signs Last 24 Hrs  T(C): 36.7 (26 Jan 2019 08:12), Max: 36.7 (25 Jan 2019 16:45)  T(F): 98 (26 Jan 2019 08:12), Max: 98.1 (25 Jan 2019 16:45)  HR: 111 (26 Jan 2019 08:12) (69 - 118)  BP: 125/92 (26 Jan 2019 08:12) (102/67 - 129/85)  BP(mean): --  RR: 16 (26 Jan 2019 08:12) (14 - 20)  SpO2: 95% (26 Jan 2019 08:12) (95% - 99%)    REVIEW OF SYSTEMS:  CONSTITUTIONAL: No fever, weight loss, or fatigue  RESPIRATORY: No cough, wheezing, chills or hemoptysis; No shortness of breath  CARDIOVASCULAR: No chest pain or chest pressure.  No shortness of breath or dyspnea on exertion.  No palpitations, dizziness, light headedness, syncope or near syncope.  No orthopnea.   NEUROLOGICAL: No headaches, memory loss, loss of strength, numbness, or tremors      PHYSICAL EXAM  Constitutional:  WDWN. No acute distress  HEENT: normocephalic, atraumatic.  PERRLA. EOMI  Neck : No JVD. no carotid bruits  Lungs:  clear to auscultation bilaterally. no rhonchi. no wheezing  Heart:  S1 and S2. No S3, S4. II/VI systolic murmur.  Abdomen:  soft, non tender.  Extremities: No clubbing, cyanoisis or edema  Nuerologic:  A+O x 3. No focal deficits  Skin:  no rashes        LABS:                        10.2   7.2   )-----------( 164      ( 26 Jan 2019 07:40 )             34.2     01-26    142  |  111<H>  |  61<H>  ----------------------------<  83  6.0<H>   |  12<L>  |  3.15<H>    Ca    8.8      26 Jan 2019 08:55  Phos  6.6     01-26  Mg     2.7     01-26    TPro  7.2  /  Alb  2.8<L>  /  TBili  0.5  /  DBili  x   /  AST  61<H>  /  ALT  42  /  AlkPhos  149<H>  01-25    CARDIAC MARKERS ( 25 Jan 2019 14:20 )  .019 ng/mL / x     / x     / x     / x          CARDIAC MARKERS ( 25 Jan 2019 14:20 )  .019 ng/mL / x     / x     / x     / x          heparin  Injectable 5000 Unit(s) SubCutaneous every 8 hours  metoprolol tartrate 25 milliGRAM(s) Oral two times a day  pantoprazole    Tablet 40 milliGRAM(s) Oral before breakfast  pramipexole 0.25 milliGRAM(s) Oral at bedtime  sodium bicarbonate 650 milliGRAM(s) Oral three times a day  sodium chloride 0.9%. 1000 milliLiter(s) IV Continuous <Continuous>  sodium polystyrene sulfonate Suspension 30 Gram(s) Oral once    I&O's Summary    26 Jan 2019 07:01  -  26 Jan 2019 13:58  --------------------------------------------------------  IN: 300 mL / OUT: 0 mL / NET: 300 mL      BNPSerum Pro-Brain Natriuretic Peptide: 98212 pg/mL (01-25 @ 14:20)    Troponin I, Serum: .019 ng/mL (01-25-19 @ 14:20)      Troponin I, Serum: .019 ng/mL (01-25-19 @ 14:20)    < from: TTE Echo Complete w/Doppler (01.26.19 @ 10:07) >   1. Left ventricular ejection fraction, by visual estimation, is 50 to   55%.   2. Normal global left ventricular systolic function.   3. Spectral Doppler shows restrictive pattern of left ventricular   myocardial filling (Grade III diastolic dysfunction).   4. Moderately reduced RV systolic function.   5. Severely enlarged right ventricle.   6. Moderate pleural effusion in both left and right lateral regions.   7. Mild mitral annularcalcification.   8. Thickening of the anterior and posterior mitral valve leaflets.   9. Severe tricuspid regurgitation.  10. Severe aortic valve stenosis.  11. Trace pulmonic valve regurgitation.  12. Estimated pulmonary artery systolic pressure is 39.7 mmHg assuming a   right atrial pressure of 10 mmHg, which is consistent with borderline   pulmonary hypertension.  13. The aortic valve mean gradient is 14.2 mmHg consistent with mild   aortic stenosis.    < end of copied text >

## 2019-01-28 NOTE — DISCHARGE NOTE ADULT - ADDITIONAL INSTRUCTIONS
Appointment with Dr. Valdes 1/30/2019 at 12pm.  If you cannot make this appointment or need to switch the doctor please call the office at [phone].

## 2019-01-28 NOTE — PROGRESS NOTE ADULT - ASSESSMENT
89 yo f admitted 1-25-19 pmh ckd stage 4, gastric cancer presents with new onset atrial flutter    1. new onset atrial flutter: rate controlled c/w lopressor patient refusing a/c risk and benefits explained  2. hyperkalemia: improved   3. liat on ckd stage 4 baseline creatinine 1.8 pt now on sodium bicarb f/u renal   4. anemia of renal disease renal consult placed start ferrous sulfate  5. dvt ppx: heparin subq  6. dispo: pt consult placed recommened home w home pt  7. hypernatremia: improved   8. transaminitis: secondary to passive congestion restart lasix when liat improves   d/c today  dr causey informed via phone call

## 2019-01-28 NOTE — DISCHARGE NOTE ADULT - CARE PROVIDERS DIRECT ADDRESSES
[email or web address]

## 2019-01-28 NOTE — DISCHARGE NOTE ADULT - PATIENT PORTAL LINK FT
You can access the [facility] Patient Portal, offered by [facility], by registering with the following website: [email or web address]

## 2019-01-28 NOTE — DISCHARGE NOTE ADULT - CARE PROVIDER_API CALL
Stevenson Ackerman), Internal Medicine; Pulmonary Disease; Sleep Medicine  [address] [place] [address]  Phone: [phone]  Fax: [phone]

## 2019-01-30 ENCOUNTER — APPOINTMENT (OUTPATIENT)
Dept: FAMILY MEDICINE | Facility: CLINIC | Age: 84
End: 2019-01-30
Payer: MEDICARE

## 2019-01-30 ENCOUNTER — EMERGENCY (EMERGENCY)
Facility: HOSPITAL | Age: 84
LOS: 1 days | Discharge: HOME | End: 2019-01-30
Attending: INTERNAL MEDICINE | Admitting: INTERNAL MEDICINE
Payer: MEDICARE

## 2019-01-30 VITALS
OXYGEN SATURATION: 95 % | RESPIRATION RATE: 17 BRPM | SYSTOLIC BLOOD PRESSURE: 134 MMHG | HEIGHT: 62 IN | WEIGHT: 123.9 LBS | TEMPERATURE: 98 F | DIASTOLIC BLOOD PRESSURE: 83 MMHG | HEART RATE: 103 BPM

## 2019-01-30 VITALS
SYSTOLIC BLOOD PRESSURE: 134 MMHG | RESPIRATION RATE: 15 BRPM | HEART RATE: 88 BPM | OXYGEN SATURATION: 99 % | DIASTOLIC BLOOD PRESSURE: 89 MMHG | TEMPERATURE: 97 F

## 2019-01-30 VITALS
BODY MASS INDEX: 26.75 KG/M2 | HEIGHT: 57 IN | SYSTOLIC BLOOD PRESSURE: 90 MMHG | HEART RATE: 89 BPM | DIASTOLIC BLOOD PRESSURE: 60 MMHG | OXYGEN SATURATION: 90 % | WEIGHT: 124 LBS

## 2019-01-30 DIAGNOSIS — R06.09 OTHER FORMS OF DYSPNEA: ICD-10-CM

## 2019-01-30 DIAGNOSIS — N17.9 ACUTE KIDNEY FAILURE, UNSPECIFIED: ICD-10-CM

## 2019-01-30 DIAGNOSIS — D64.9 ANEMIA, UNSPECIFIED: ICD-10-CM

## 2019-01-30 DIAGNOSIS — R60.0 LOCALIZED EDEMA: ICD-10-CM

## 2019-01-30 DIAGNOSIS — I48.92 UNSPECIFIED ATRIAL FLUTTER: ICD-10-CM

## 2019-01-30 LAB
ALBUMIN SERPL ELPH-MCNC: 2.8 G/DL — LOW (ref 3.3–5)
ALP SERPL-CCNC: 222 U/L — HIGH (ref 40–120)
ALT FLD-CCNC: 91 U/L DA — HIGH (ref 10–45)
ANION GAP SERPL CALC-SCNC: 17 MMOL/L — SIGNIFICANT CHANGE UP (ref 5–17)
AST SERPL-CCNC: 98 U/L — HIGH (ref 10–40)
BILIRUB SERPL-MCNC: 0.6 MG/DL — SIGNIFICANT CHANGE UP (ref 0.2–1.2)
BUN SERPL-MCNC: 85 MG/DL — HIGH (ref 7–23)
CALCIUM SERPL-MCNC: 8.1 MG/DL — LOW (ref 8.4–10.5)
CHLORIDE SERPL-SCNC: 108 MMOL/L — SIGNIFICANT CHANGE UP (ref 96–108)
CO2 SERPL-SCNC: 18 MMOL/L — LOW (ref 22–31)
CREAT SERPL-MCNC: 3.57 MG/DL — HIGH (ref 0.5–1.3)
GLUCOSE SERPL-MCNC: 92 MG/DL — SIGNIFICANT CHANGE UP (ref 70–99)
HCT VFR BLD CALC: 33.7 % — LOW (ref 34.5–45)
HGB BLD-MCNC: 10.1 G/DL — LOW (ref 11.5–15.5)
MCHC RBC-ENTMCNC: 28.2 PG — SIGNIFICANT CHANGE UP (ref 27–34)
MCHC RBC-ENTMCNC: 30 GM/DL — LOW (ref 32–36)
MCV RBC AUTO: 94 FL — SIGNIFICANT CHANGE UP (ref 80–100)
NT-PROBNP SERPL-SCNC: HIGH PG/ML (ref 0–300)
PLATELET # BLD AUTO: 203 K/UL — SIGNIFICANT CHANGE UP (ref 150–400)
POTASSIUM SERPL-MCNC: 5.2 MMOL/L — SIGNIFICANT CHANGE UP (ref 3.5–5.3)
POTASSIUM SERPL-SCNC: 5.2 MMOL/L — SIGNIFICANT CHANGE UP (ref 3.5–5.3)
PROT SERPL-MCNC: 7.2 G/DL — SIGNIFICANT CHANGE UP (ref 6–8.3)
RBC # BLD: 3.59 M/UL — LOW (ref 3.8–5.2)
RBC # FLD: 16.3 % — HIGH (ref 10.3–14.5)
SODIUM SERPL-SCNC: 143 MMOL/L — SIGNIFICANT CHANGE UP (ref 135–145)
TROPONIN I SERPL-MCNC: <.017 NG/ML — LOW (ref 0.02–0.06)
WBC # BLD: 7.1 K/UL — SIGNIFICANT CHANGE UP (ref 3.8–10.5)
WBC # FLD AUTO: 7.1 K/UL — SIGNIFICANT CHANGE UP (ref 3.8–10.5)

## 2019-01-30 PROCEDURE — 99496 TRANSJ CARE MGMT HIGH F2F 7D: CPT | Mod: 25

## 2019-01-30 PROCEDURE — 99285 EMERGENCY DEPT VISIT HI MDM: CPT

## 2019-01-30 PROCEDURE — 93000 ELECTROCARDIOGRAM COMPLETE: CPT

## 2019-01-30 PROCEDURE — 99284 EMERGENCY DEPT VISIT MOD MDM: CPT | Mod: 25

## 2019-01-30 PROCEDURE — 71045 X-RAY EXAM CHEST 1 VIEW: CPT

## 2019-01-30 PROCEDURE — 71045 X-RAY EXAM CHEST 1 VIEW: CPT | Mod: 26

## 2019-01-30 PROCEDURE — 85027 COMPLETE CBC AUTOMATED: CPT

## 2019-01-30 PROCEDURE — 80053 COMPREHEN METABOLIC PANEL: CPT

## 2019-01-30 PROCEDURE — 93970 EXTREMITY STUDY: CPT | Mod: 26

## 2019-01-30 PROCEDURE — 93010 ELECTROCARDIOGRAM REPORT: CPT

## 2019-01-30 PROCEDURE — 84484 ASSAY OF TROPONIN QUANT: CPT

## 2019-01-30 PROCEDURE — 93005 ELECTROCARDIOGRAM TRACING: CPT

## 2019-01-30 PROCEDURE — 93970 EXTREMITY STUDY: CPT

## 2019-01-30 PROCEDURE — 83880 ASSAY OF NATRIURETIC PEPTIDE: CPT

## 2019-01-30 RX ORDER — SODIUM BICARBONATE 650 MG/1
650 TABLET ORAL TWICE DAILY
Refills: 0 | Status: ACTIVE | COMMUNITY
Start: 2019-01-30

## 2019-01-30 RX ORDER — PRAMIPEXOLE DIHYDROCHLORIDE 0.12 MG/1
1 TABLET ORAL
Qty: 0 | Refills: 0 | COMMUNITY

## 2019-01-30 RX ORDER — PANTOPRAZOLE SODIUM 20 MG/1
1 TABLET, DELAYED RELEASE ORAL
Qty: 0 | Refills: 0 | COMMUNITY

## 2019-01-30 RX ORDER — METOPROLOL TARTRATE 25 MG/1
25 TABLET, FILM COATED ORAL EVERY 8 HOURS
Refills: 0 | Status: ACTIVE | COMMUNITY
Start: 2019-01-30

## 2019-01-30 NOTE — ED PROVIDER NOTE - ATTENDING CONTRIBUTION TO CARE
· HPI Objective Statement: 39 yo female, hx UC, proctitis, seasonal depression, EBV, presents to the ED from [facility] office due to fever and abdominal pain.  Pt has been having abd pain, bloody BM, fevers, for about 1-2 months, Was recently seen by GI 1 week ago when her daily meds for her UC were increased and she was placed on steroids (rectally).   Denies any vomiting, urinary sx, or other complaints.  · Presenting Symptoms: FEVER, NAUSEA, PAIN  · Negative Findings: no vomiting  · Timing: constant  · Duration: month(s)  · Severity: PAIN SCALE 5 OF 10.  pt is unable to perform adl lives alone family requests pt for admission to rehab  Dr. Johnson:  I have reviewed and discussed with the PA/ resident the case specifics, including the history, physical assessment, evaluation, conclusion, laboratory results, and medical plan. I agree with the contents, and conclusions. I have personally examined, and interviewed the patient.

## 2019-01-30 NOTE — REVIEW OF SYSTEMS
[Fatigue] : fatigue [Lower Ext Edema] : lower extremity edema [Dyspnea on Exertion] : dyspnea on exertion [Negative] : Psychiatric [Chest Pain] : no chest pain [Palpitations] : no palpitations [Leg Claudication] : no leg claudication [Orthopnea] : no orthopnea [Paroysmal Nocturnal Dyspnea] : no paroysmal nocturnal dyspnea [Shortness Of Breath] : no shortness of breath [Wheezing] : no wheezing [Cough] : no cough [FreeTextEntry5] : worsening LE edema ( gained 4 lb since last visit)  [de-identified] : left thigh bruise

## 2019-01-30 NOTE — ED ADULT NURSE NOTE - OBJECTIVE STATEMENT
Patient sent by pcp due to swelling in lower legs. Patient reports having pain there with ambulation, denies any shortness of breath, chest pain or dizziness. Was recently admitted here for similar reason last week.

## 2019-01-30 NOTE — ED PROVIDER NOTE - PMH
Aortic valve stenosis, etiology of cardiac valve disease unspecified    Atrial fibrillation, unspecified type    Chronic kidney disease, unspecified CKD stage    Congestive heart failure, unspecified HF chronicity, unspecified heart failure type    Gastric cancer

## 2019-01-30 NOTE — ED PROVIDER NOTE - OBJECTIVE STATEMENT
89 y/o F with PMH of CKD, HTN, AS, CHF was sent to the ED by her PCP Dr. Christian for b/l leg swelling, she wants patient admitted for rehab. Patient denies CP, SOB, palpitations, f/c, URI symptoms or all other medical complaints. Patient reports pain to b/l calf with ambulation x today.

## 2019-01-30 NOTE — ED PROVIDER NOTE - PROGRESS NOTE DETAILS
SAMANTHA Salinas: labs reviewed-- which are similar to her baseline, DVT US results negative.   Patient seen by Case Mangchano and Rehab arranged at [facility].

## 2019-01-30 NOTE — ED PROVIDER NOTE - CARE PLAN
Principal Discharge DX:	Walking difficulty due to lower leg  Secondary Diagnosis:	Congestive heart failure, unspecified HF chronicity, unspecified heart failure type  Secondary Diagnosis:	Chronic kidney disease, unspecified CKD stage

## 2019-01-30 NOTE — ED ADULT NURSE NOTE - NSIMPLEMENTINTERV_GEN_ALL_ED
Implemented All Universal Safety Interventions:  [place] to call system. Call bell, personal items and telephone within reach. Instruct patient to call for assistance. Room bathroom lighting operational. Non-slip footwear when patient is off stretcher. Physically safe environment: no spills, clutter or unnecessary equipment. Stretcher in lowest position, wheels locked, appropriate side rails in place.

## 2019-01-30 NOTE — PHYSICAL EXAM
[Normal Sclera/Conjunctiva] : normal sclera/conjunctiva [Normal Outer Ear/Nose] : the outer ears and nose were normal in appearance [Normal Oropharynx] : the oropharynx was normal [No JVD] : no jugular venous distention [Supple] : supple [No Respiratory Distress] : no respiratory distress  [No Accessory Muscle Use] : no accessory muscle use [Normal S1, S2] : normal S1 and S2 [Soft] : abdomen soft [Non Tender] : non-tender [Non-distended] : non-distended [No Masses] : no abdominal mass palpated [No HSM] : no HSM [Normal Bowel Sounds] : normal bowel sounds [No Rash] : no rash [Alert and Oriented x3] : oriented to person, place, and time [High Complexity requires an extensive number of possible diagnoses and/or the management options, extensive complexity of the medical data (tests, etc.) to be reviewed, and a high risk of significant complications, morbidity, and/or mortality as well as c] : High Complexity  [de-identified] : pale, fatigue  [de-identified] : decreased breath sound  [de-identified] : irregular , + systolic murmus  [de-identified] : + 3 b/l LE edema  [de-identified] : walking with the [place]

## 2019-01-30 NOTE — HISTORY OF PRESENT ILLNESS
[Post-hospitalization from ___ Hospital] : Post-hospitalization from [unfilled] Hospital [Admitted on: ___] : The patient was admitted on [unfilled] [Discharged on ___] : discharged on [unfilled] [Discharge Summary] : discharge summary [Pertinent Labs] : pertinent labs [Discharge Med List] : discharge medication list [Patient Contacted By: ____] : and contacted by [unfilled] [FreeTextEntry2] : Patient presented to the Office with thad lebron. Patient was admitted with new onset A. fib, CHF, PRICE. after eval in  my Office. Patient  was on Lasix in the Hospital , her renal function worsened so  was  her Anemia. Today she was brought by her nice , who stays that patient LE worsened since d/c from the Hospital, + generalized weakness, patient was noticed by Aid to be SOB, patient lives alone, has aid 6 hours only. patient denies CP, no abd pain. She was atrted on Metoprolol and Bicarb in the Hospital, She was d/w w/o any diuretics. \par \par EKG done today in the Office - A, fib  rate  85 with PVC,

## 2019-01-30 NOTE — ED PROVIDER NOTE - NSFOLLOWUPINSTRUCTIONS_ED_ALL_ED_FT
Follow up your test results with your primary care physician   Return to the ER if your symptoms worsen, high fevers, severe pain or for any other medical emergencies

## 2019-01-30 NOTE — ED PROVIDER NOTE - SECONDARY DIAGNOSIS.
Congestive heart failure, unspecified HF chronicity, unspecified heart failure type Chronic kidney disease, unspecified CKD stage

## 2019-01-30 NOTE — ED PROVIDER NOTE - PHYSICAL EXAMINATION
AAOx3  Heart: s1/s2, RRR  Lung: CTA b/l  Abd: soft, NT/ND, no rebound or guarding, NCVAT  Extremity: no calf tenderness, 2+ pedal edema b/l   good pedal pulses   good cap refill   Neuro: patient moving all extremity equally,

## 2019-01-30 NOTE — ASSESSMENT
[FreeTextEntry1] : Case d/w the patient , family member -Ed eval was recommended , BIANKA placement advised. Ed Att. Elizabeth Romero aware - c/w him at length .

## 2019-02-03 ENCOUNTER — EMERGENCY (EMERGENCY)
Facility: HOSPITAL | Age: 84
LOS: 1 days | End: 2019-02-03
Attending: EMERGENCY MEDICINE | Admitting: EMERGENCY MEDICINE
Payer: MEDICARE

## 2019-02-03 VITALS
WEIGHT: 100.09 LBS | DIASTOLIC BLOOD PRESSURE: 96 MMHG | RESPIRATION RATE: 20 BRPM | TEMPERATURE: 94 F | OXYGEN SATURATION: 90 % | HEART RATE: 74 BPM | SYSTOLIC BLOOD PRESSURE: 113 MMHG

## 2019-02-03 LAB
ALBUMIN SERPL ELPH-MCNC: 2.6 G/DL — LOW (ref 3.3–5)
ALP SERPL-CCNC: 217 U/L — HIGH (ref 40–120)
ALT FLD-CCNC: 120 U/L DA — HIGH (ref 10–45)
ANION GAP SERPL CALC-SCNC: 18 MMOL/L — HIGH (ref 5–17)
APPEARANCE UR: ABNORMAL
APTT BLD: 41.5 SEC — HIGH (ref 27.5–36.3)
AST SERPL-CCNC: 197 U/L — HIGH (ref 10–40)
BASOPHILS # BLD AUTO: 0 K/UL — SIGNIFICANT CHANGE UP (ref 0–0.2)
BASOPHILS NFR BLD AUTO: 0.4 % — SIGNIFICANT CHANGE UP (ref 0–2)
BILIRUB SERPL-MCNC: 0.8 MG/DL — SIGNIFICANT CHANGE UP (ref 0.2–1.2)
BILIRUB UR-MCNC: ABNORMAL
BUN SERPL-MCNC: 134 MG/DL — HIGH (ref 7–23)
CALCIUM SERPL-MCNC: 7.6 MG/DL — LOW (ref 8.4–10.5)
CHLORIDE SERPL-SCNC: 108 MMOL/L — SIGNIFICANT CHANGE UP (ref 96–108)
CO2 BLDA-SCNC: 16 MMOL/L — LOW (ref 22–30)
CO2 SERPL-SCNC: 17 MMOL/L — LOW (ref 22–31)
COLOR SPEC: YELLOW — SIGNIFICANT CHANGE UP
CREAT SERPL-MCNC: 5.13 MG/DL — HIGH (ref 0.5–1.3)
DIFF PNL FLD: ABNORMAL
EOSINOPHIL # BLD AUTO: 0 K/UL — SIGNIFICANT CHANGE UP (ref 0–0.5)
EOSINOPHIL NFR BLD AUTO: 0 % — SIGNIFICANT CHANGE UP (ref 0–6)
GAS PNL BLDA: SIGNIFICANT CHANGE UP
GLUCOSE SERPL-MCNC: 123 MG/DL — HIGH (ref 70–99)
GLUCOSE UR QL: NEGATIVE — SIGNIFICANT CHANGE UP
HCT VFR BLD CALC: 32.8 % — LOW (ref 34.5–45)
HGB BLD-MCNC: 9.9 G/DL — LOW (ref 11.5–15.5)
HOROWITZ INDEX BLDA+IHG-RTO: SIGNIFICANT CHANGE UP
INR BLD: 2.03 RATIO — HIGH (ref 0.88–1.16)
KETONES UR-MCNC: ABNORMAL
LACTATE SERPL-SCNC: 2.1 MMOL/L — HIGH (ref 0.7–2)
LACTATE SERPL-SCNC: 2.4 MMOL/L — HIGH (ref 0.7–2)
LEUKOCYTE ESTERASE UR-ACNC: ABNORMAL
LYMPHOCYTES # BLD AUTO: 0.6 K/UL — LOW (ref 1–3.3)
LYMPHOCYTES # BLD AUTO: 4.8 % — LOW (ref 13–44)
MCHC RBC-ENTMCNC: 27.3 PG — SIGNIFICANT CHANGE UP (ref 27–34)
MCHC RBC-ENTMCNC: 30.2 GM/DL — LOW (ref 32–36)
MCV RBC AUTO: 90.4 FL — SIGNIFICANT CHANGE UP (ref 80–100)
MONOCYTES # BLD AUTO: 0.6 K/UL — SIGNIFICANT CHANGE UP (ref 0–0.9)
MONOCYTES NFR BLD AUTO: 4.9 % — SIGNIFICANT CHANGE UP (ref 1–9)
NEUTROPHILS # BLD AUTO: 10.9 K/UL — HIGH (ref 1.8–7.4)
NEUTROPHILS NFR BLD AUTO: 89.9 % — HIGH (ref 43–77)
NITRITE UR-MCNC: NEGATIVE — SIGNIFICANT CHANGE UP
NT-PROBNP SERPL-SCNC: HIGH PG/ML (ref 0–300)
PCO2 BLDA: 30 MMHG — LOW (ref 32–46)
PH BLDA: 7.32 — LOW (ref 7.35–7.45)
PH UR: 5 — SIGNIFICANT CHANGE UP (ref 5–8)
PLATELET # BLD AUTO: 125 K/UL — LOW (ref 150–400)
PO2 BLDA: 162 MMHG — HIGH (ref 74–108)
POTASSIUM SERPL-MCNC: 6.1 MMOL/L — HIGH (ref 3.5–5.3)
POTASSIUM SERPL-SCNC: 6.1 MMOL/L — HIGH (ref 3.5–5.3)
PROT SERPL-MCNC: 6.1 G/DL — SIGNIFICANT CHANGE UP (ref 6–8.3)
PROT UR-MCNC: 100
PROTHROM AB SERPL-ACNC: 23.2 SEC — HIGH (ref 10–12.9)
RBC # BLD: 3.62 M/UL — LOW (ref 3.8–5.2)
RBC # FLD: 16 % — HIGH (ref 10.3–14.5)
SAO2 % BLDA: 98 % — HIGH (ref 92–96)
SODIUM SERPL-SCNC: 143 MMOL/L — SIGNIFICANT CHANGE UP (ref 135–145)
SP GR SPEC: 1.02 — SIGNIFICANT CHANGE UP (ref 1.01–1.02)
TROPONIN I SERPL-MCNC: 0.31 NG/ML — HIGH (ref 0.02–0.06)
UROBILINOGEN FLD QL: 1
WBC # BLD: 12.1 K/UL — HIGH (ref 3.8–10.5)
WBC # FLD AUTO: 12.1 K/UL — HIGH (ref 3.8–10.5)

## 2019-02-03 PROCEDURE — 99291 CRITICAL CARE FIRST HOUR: CPT

## 2019-02-03 PROCEDURE — 74176 CT ABD & PELVIS W/O CONTRAST: CPT | Mod: 26

## 2019-02-03 PROCEDURE — 93010 ELECTROCARDIOGRAM REPORT: CPT

## 2019-02-03 PROCEDURE — 71045 X-RAY EXAM CHEST 1 VIEW: CPT | Mod: 26

## 2019-02-03 PROCEDURE — 71250 CT THORAX DX C-: CPT | Mod: 26

## 2019-02-03 RX ORDER — MORPHINE SULFATE 50 MG/1
2 CAPSULE, EXTENDED RELEASE ORAL ONCE
Qty: 0 | Refills: 0 | Status: DISCONTINUED | OUTPATIENT
Start: 2019-02-03 | End: 2019-02-03

## 2019-02-03 RX ORDER — PIPERACILLIN AND TAZOBACTAM 4; .5 G/20ML; G/20ML
3.38 INJECTION, POWDER, LYOPHILIZED, FOR SOLUTION INTRAVENOUS ONCE
Qty: 0 | Refills: 0 | Status: COMPLETED | OUTPATIENT
Start: 2019-02-03 | End: 2019-02-03

## 2019-02-03 RX ORDER — METRONIDAZOLE 500 MG
500 TABLET ORAL ONCE
Qty: 0 | Refills: 0 | Status: COMPLETED | OUTPATIENT
Start: 2019-02-03 | End: 2019-02-03

## 2019-02-03 RX ORDER — SODIUM CHLORIDE 9 MG/ML
1000 INJECTION INTRAMUSCULAR; INTRAVENOUS; SUBCUTANEOUS ONCE
Qty: 0 | Refills: 0 | Status: COMPLETED | OUTPATIENT
Start: 2019-02-03 | End: 2019-02-03

## 2019-02-03 RX ORDER — VANCOMYCIN HCL 1 G
1000 VIAL (EA) INTRAVENOUS ONCE
Qty: 0 | Refills: 0 | Status: COMPLETED | OUTPATIENT
Start: 2019-02-03 | End: 2019-02-03

## 2019-02-03 RX ORDER — SODIUM CHLORIDE 9 MG/ML
1400 INJECTION INTRAMUSCULAR; INTRAVENOUS; SUBCUTANEOUS ONCE
Qty: 0 | Refills: 0 | Status: COMPLETED | OUTPATIENT
Start: 2019-02-03 | End: 2019-02-03

## 2019-02-03 RX ADMIN — Medication 250 MILLIGRAM(S): at 23:42

## 2019-02-03 RX ADMIN — SODIUM CHLORIDE 1000 MILLILITER(S): 9 INJECTION INTRAMUSCULAR; INTRAVENOUS; SUBCUTANEOUS at 23:13

## 2019-02-03 RX ADMIN — SODIUM CHLORIDE 2000 MILLILITER(S): 9 INJECTION INTRAMUSCULAR; INTRAVENOUS; SUBCUTANEOUS at 22:51

## 2019-02-03 RX ADMIN — PIPERACILLIN AND TAZOBACTAM 200 GRAM(S): 4; .5 INJECTION, POWDER, LYOPHILIZED, FOR SOLUTION INTRAVENOUS at 20:50

## 2019-02-03 RX ADMIN — SODIUM CHLORIDE 1400 MILLILITER(S): 9 INJECTION INTRAMUSCULAR; INTRAVENOUS; SUBCUTANEOUS at 20:15

## 2019-02-03 RX ADMIN — PIPERACILLIN AND TAZOBACTAM 3.38 GRAM(S): 4; .5 INJECTION, POWDER, LYOPHILIZED, FOR SOLUTION INTRAVENOUS at 21:30

## 2019-02-03 RX ADMIN — SODIUM CHLORIDE 1400 MILLILITER(S): 9 INJECTION INTRAMUSCULAR; INTRAVENOUS; SUBCUTANEOUS at 22:08

## 2019-02-03 RX ADMIN — MORPHINE SULFATE 2 MILLIGRAM(S): 50 CAPSULE, EXTENDED RELEASE ORAL at 23:59

## 2019-02-03 NOTE — ED ADULT NURSE NOTE - NSIMPLEMENTINTERV_GEN_ALL_ED
Implemented All Fall with Harm Risk Interventions:  [place] to call system. Call bell, personal items and telephone within reach. Instruct patient to call for assistance. Room bathroom lighting operational. Non-slip footwear when patient is off stretcher. Physically safe environment: no spills, clutter or unnecessary equipment. Stretcher in lowest position, wheels locked, appropriate side rails in place. Provide visual cue, wrist band, yellow gown, etc. Monitor gait and stability. Monitor for mental status changes and reorient to person, place, and time. Review medications for side effects contributing to fall risk. Reinforce activity limits and safety measures with patient and family. Provide visual clues: red socks.

## 2019-02-03 NOTE — ED PROVIDER NOTE - CROS ED CONS ALL NEG
Patient's preferred pharmacy has been set up and verified.  Per fax, there were no changes to dose, sig or quantity.       negative...

## 2019-02-03 NOTE — ED PROVIDER NOTE - CRITICAL CARE PROVIDED
documentation/consult w/ pt's family directly relating to pts condition/direct patient care (not related to procedure)/additional history taking/interpretation of diagnostic studies

## 2019-02-03 NOTE — ED ADULT NURSE REASSESSMENT NOTE - NS ED NURSE REASSESS COMMENT FT1
lethargic, sbp 76, md vázquez notified, ,now heart rate 40, md vázquez notified , breathing labored,will monitor.

## 2019-02-03 NOTE — ED PROVIDER NOTE - MEDICAL DECISION MAKING DETAILS
abd pain, lethargy per nh.  hypothermic in ED. RLQ ttp. r/o colitis, sepsis, uti, vascular pathology. check labs, ct abdpelv. give iv fluids abx. azael snyder. reasssess

## 2019-02-03 NOTE — ED PROVIDER NOTE - SKIN, MLM
Skin normal color for race,  dry and intact. No evidence of rash.  feels cold Skin   dry and intact. No evidence of rash.  feels cold, cyanotic fingers

## 2019-02-03 NOTE — ED PROVIDER NOTE - CARE PLAN
Principal Discharge DX:	Severe sepsis with septic shock  Secondary Diagnosis:	Colitis  Secondary Diagnosis:	Urinary tract infection without hematuria, site unspecified

## 2019-02-03 NOTE — ED ADULT NURSE REASSESSMENT NOTE - NS ED NURSE REASSESS COMMENT FT1
brought in by ambulance , from [facility] c/o difficulty of breathing ,lethargic , extremities cold, rectal temp 93.c, placed on azael huyasir, difficult stick, md vázquez obtained blood culture , bloods via femoral stic, , abx started, fluids given, .

## 2019-02-03 NOTE — ED PROVIDER NOTE - OBJECTIVE STATEMENT
pt sent from NH, pt c/o abd pain, diffuse all day today. no radiation. no nausea/vomiting/diarrhea. no dysuria. NH states pt looked lethargic. EMS stated pt pulse ox 50% and looked short of breath. pt denies chest pain, sob, cough, fever,

## 2019-02-03 NOTE — GOALS OF CARE CONVERSATION - PERSONAL ADVANCE DIRECTIVE - CONVERSATION DETAILS
Pt wants full code. Pt would want a trial of intubation Pt initially A&Ox3, but in some distress. does not seem to fully appreciate her condition or able to make coherent decision.  I spoke with HCP by phone Brianna Honeycutt, who came to ER. explained to her pt's condition and moribund prognosis. she states that pt had made it clear in past and in her living will that she did not want heroic measures taken if she was critically ill and that she would NOT want intubation or resuscitation.  HCP also did not want pt to have central IV line or vasopressors after discussion of r/b/a.  she would want pt to have trial of iv fluids and antibiotics and to have pt be made comfortable.  she requested  for last rites.   was called who states he gave pt last rites last week, he spoke directly with HCP.      HCP agreeable to possible hospice admission and agree to consult.  hospice consult requested 23:30. they will evaluate pt this morning

## 2019-02-03 NOTE — ED PROVIDER NOTE - PROGRESS NOTE DETAILS
Hospice consulted. I spoke with hospice RN Barb who discussed case with covering provider Saúl Vallecillo NP.  they state that pt will be evaluated in the morning for possible hospice admission but pt unable to be admitted to hospice tonight.  will keep pt in ED until hospice evaluation/admission pt appears moribund. bp low (60's), bradycardic in 30's. obtunded, does not respond. occasional moans, tachypneic. appears in pain. had c/o abd pain c abd ttp earlier. additional morphine given for comfort extensive discussion had with HCP niece Brianna Honeycutt re pt's moribund prognosis. she stated pt's wish was to be DNR/DNI and to have NO heroic measures taken. does not want central line, vasopressors. wants comfort care, trial of iv fluids, antibiotics. see Goals of Care note vasopressors / further sepsis protocol withheld as HCP requests comfort care I was called to bedside to evaluate pt for asystole, unresponsiveness. pt asystolic, unresponsive, without heart sounds, pulses or spontaneous breaths. pupils fixed and dilated. pronounced dead 05:14.  informed HCP/ niece Brianna Honeycutt by phone [phone] of pt's death. she states pt did not want organ donation. family does not want autopsy. PMD Dr GEORGIE Ackerman

## 2019-02-03 NOTE — ED PROVIDER NOTE - CARDIAC, MLM
Normal rate, regular rhythm.  Heart sounds S1, S2.  No murmurs, rubs or gallops.
f/up repeat trop/EKG. If neg and pt improved/stable dc with outpt cards f/up.

## 2019-02-04 PROBLEM — I50.9 HEART FAILURE, UNSPECIFIED: Chronic | Status: ACTIVE | Noted: 2019-01-30

## 2019-02-04 PROBLEM — I48.91 UNSPECIFIED ATRIAL FIBRILLATION: Chronic | Status: ACTIVE | Noted: 2019-01-30

## 2019-02-04 PROBLEM — I35.0 NONRHEUMATIC AORTIC (VALVE) STENOSIS: Chronic | Status: ACTIVE | Noted: 2019-01-30

## 2019-02-04 PROBLEM — N18.9 CHRONIC KIDNEY DISEASE, UNSPECIFIED: Chronic | Status: ACTIVE | Noted: 2019-01-30

## 2019-02-04 LAB — TSH SERPL-MCNC: 2.07 UIU/ML — SIGNIFICANT CHANGE UP (ref 0.27–4.2)

## 2019-02-04 PROCEDURE — 80053 COMPREHEN METABOLIC PANEL: CPT

## 2019-02-04 PROCEDURE — 86850 RBC ANTIBODY SCREEN: CPT

## 2019-02-04 PROCEDURE — 74176 CT ABD & PELVIS W/O CONTRAST: CPT

## 2019-02-04 PROCEDURE — 96365 THER/PROPH/DIAG IV INF INIT: CPT

## 2019-02-04 PROCEDURE — 87086 URINE CULTURE/COLONY COUNT: CPT

## 2019-02-04 PROCEDURE — 71045 X-RAY EXAM CHEST 1 VIEW: CPT

## 2019-02-04 PROCEDURE — 36415 COLL VENOUS BLD VENIPUNCTURE: CPT

## 2019-02-04 PROCEDURE — 96367 TX/PROPH/DG ADDL SEQ IV INF: CPT

## 2019-02-04 PROCEDURE — 83605 ASSAY OF LACTIC ACID: CPT

## 2019-02-04 PROCEDURE — 93005 ELECTROCARDIOGRAM TRACING: CPT

## 2019-02-04 PROCEDURE — 99292 CRITICAL CARE ADDL 30 MIN: CPT

## 2019-02-04 PROCEDURE — 71250 CT THORAX DX C-: CPT

## 2019-02-04 PROCEDURE — 85730 THROMBOPLASTIN TIME PARTIAL: CPT

## 2019-02-04 PROCEDURE — 85027 COMPLETE CBC AUTOMATED: CPT

## 2019-02-04 PROCEDURE — 86900 BLOOD TYPING SEROLOGIC ABO: CPT

## 2019-02-04 PROCEDURE — 84484 ASSAY OF TROPONIN QUANT: CPT

## 2019-02-04 PROCEDURE — 81001 URINALYSIS AUTO W/SCOPE: CPT

## 2019-02-04 PROCEDURE — 96361 HYDRATE IV INFUSION ADD-ON: CPT

## 2019-02-04 PROCEDURE — 96375 TX/PRO/DX INJ NEW DRUG ADDON: CPT

## 2019-02-04 PROCEDURE — 83880 ASSAY OF NATRIURETIC PEPTIDE: CPT

## 2019-02-04 PROCEDURE — 86901 BLOOD TYPING SEROLOGIC RH(D): CPT

## 2019-02-04 PROCEDURE — 96376 TX/PRO/DX INJ SAME DRUG ADON: CPT

## 2019-02-04 PROCEDURE — 82803 BLOOD GASES ANY COMBINATION: CPT

## 2019-02-04 PROCEDURE — 87040 BLOOD CULTURE FOR BACTERIA: CPT

## 2019-02-04 PROCEDURE — 84443 ASSAY THYROID STIM HORMONE: CPT

## 2019-02-04 PROCEDURE — 85610 PROTHROMBIN TIME: CPT

## 2019-02-04 PROCEDURE — 99291 CRITICAL CARE FIRST HOUR: CPT

## 2019-02-04 PROCEDURE — 36600 WITHDRAWAL OF ARTERIAL BLOOD: CPT

## 2019-02-04 RX ORDER — MORPHINE SULFATE 50 MG/1
2 CAPSULE, EXTENDED RELEASE ORAL ONCE
Qty: 0 | Refills: 0 | Status: DISCONTINUED | OUTPATIENT
Start: 2019-02-04 | End: 2019-02-04

## 2019-02-04 RX ORDER — MORPHINE SULFATE 50 MG/1
4 CAPSULE, EXTENDED RELEASE ORAL ONCE
Qty: 0 | Refills: 0 | Status: DISCONTINUED | OUTPATIENT
Start: 2019-02-04 | End: 2019-02-04

## 2019-02-04 RX ADMIN — MORPHINE SULFATE 2 MILLIGRAM(S): 50 CAPSULE, EXTENDED RELEASE ORAL at 00:30

## 2019-02-04 RX ADMIN — MORPHINE SULFATE 4 MILLIGRAM(S): 50 CAPSULE, EXTENDED RELEASE ORAL at 02:27

## 2019-02-04 RX ADMIN — MORPHINE SULFATE 4 MILLIGRAM(S): 50 CAPSULE, EXTENDED RELEASE ORAL at 03:06

## 2019-02-04 RX ADMIN — Medication 100 MILLIGRAM(S): at 00:07

## 2019-02-04 RX ADMIN — Medication 500 MILLIGRAM(S): at 01:00

## 2019-02-04 RX ADMIN — Medication 1000 MILLIGRAM(S): at 00:07

## 2019-02-05 LAB
CULTURE RESULTS: SIGNIFICANT CHANGE UP
SPECIMEN SOURCE: SIGNIFICANT CHANGE UP

## 2019-02-09 LAB
CULTURE RESULTS: SIGNIFICANT CHANGE UP
CULTURE RESULTS: SIGNIFICANT CHANGE UP
SPECIMEN SOURCE: SIGNIFICANT CHANGE UP
SPECIMEN SOURCE: SIGNIFICANT CHANGE UP

## 2019-11-25 NOTE — ED ADULT NURSE REASSESSMENT NOTE - NS ED NURSE REASSESS COMMENT FT1
As per MD Foster, pt to receive Cardizem 10 mg IVP, to cancel; 30 mg po cardizem due to scheduled dose of metoprolol. Otolaryngologist Procedure Text (D): After obtaining clear surgical margins the patient was sent to otolaryngology for surgical repair.  The patient understands they will receive post-surgical care and follow-up from the referring physician's office.

## 2021-02-06 NOTE — ED ADULT NURSE NOTE - NSFALLRSKUNASSIST_ED_ALL_ED
Interval Events:  pt in nad    Allergies    No Known Allergies    Intolerances      Endocrine/Metabolic Medications:  insulin glargine Injectable (LANTUS) 20 Unit(s) SubCutaneous at bedtime  insulin lispro (ADMELOG) corrective regimen sliding scale   SubCutaneous three times a day before meals  insulin lispro (ADMELOG) corrective regimen sliding scale   SubCutaneous at bedtime  insulin lispro Injectable (ADMELOG) 6 Unit(s) SubCutaneous three times a day before meals  levothyroxine 25 MICROGram(s) Oral daily  simvastatin 20 milliGRAM(s) Oral at bedtime      Vital Signs Last 24 Hrs  T(C): 36.9 (06 Feb 2021 13:37), Max: 36.9 (06 Feb 2021 13:37)  T(F): 98.4 (06 Feb 2021 13:37), Max: 98.4 (06 Feb 2021 13:37)  HR: 109 (06 Feb 2021 13:37) (92 - 109)  BP: 126/64 (06 Feb 2021 13:37) (109/59 - 128/71)  BP(mean): 74 (05 Feb 2021 14:20) (74 - 74)  RR: 20 (06 Feb 2021 13:37) (18 - 20)  SpO2: 94% (06 Feb 2021 13:37) (93% - 96%)      PHYSICAL EXAM  All physical exam findings normal, except those marked:  General:	Alert, active, cooperative, NAD, well hydrated  .		[] Abnormal:  Neck		Normal: supple, no cervical adenopathy, no palpable thyroid  .		[] Abnormal:  Cardiovascular	Normal: regular rate, normal S1, S2, no murmurs  .		[] Abnormal:  Respiratory	Normal: no chest wall deformity, normal respiratory pattern, CTA B/L  .		[] Abnormal:  Abdominal	Normal: soft, ND, NT, bowel sounds present, no masses, no organomegaly  .		[] Abnormal:  		Normal normal genitalia, testes descended, circumcised/uncircumcised  .		El stage:			Breast el:  .		Menstrual history:  .		[] Abnormal:  Extremities	Normal: FROM x4  .		[] Abnormal:  Skin		Normal: intact and not indurated, no rash, no acanthosis nigricans  .		[] Abnormal:  Neurologic	Normal: grossly intact  .		[] Abnormal:    LABS                        9.8    17.33 )-----------( 451      ( 06 Feb 2021 07:07 )             30.5                               125    |  89     |  11                  Calcium: 8.4   / iCa: x      (02-06 @ 07:07)    ----------------------------<  170       Magnesium: x                                4.8     |  28     |  0.74             Phosphorous: x        TPro  6.2    /  Alb  2.1    /  TBili  0.3    /  DBili  x      /  AST  11     /  ALT  27     /  AlkPhos  107    06 Feb 2021 07:07    CAPILLARY BLOOD GLUCOSE      POCT Blood Glucose.: 249 mg/dL (06 Feb 2021 11:54)  POCT Blood Glucose.: 203 mg/dL (06 Feb 2021 08:04)  POCT Blood Glucose.: 242 mg/dL (05 Feb 2021 21:27)  POCT Blood Glucose.: 233 mg/dL (05 Feb 2021 16:42)        Assesment/plan    64F with DM, HTN, HLD, admitted for covid-19, on steroids with hyperglycemia     Problem/Recommendation - 1:  Problem: Type 2 diabetes mellitus with hyperglycemia  -uncontrolled type 2 DM with a1c of 10.5, complicated by neuropathy  -fs better controlled now  -change premeal admelog to 8 ac tid as pt off of decadron   -Change lantus to 20  -monitor fs ac hs   -discharge regimen to be determined based on trends  d/w hs       Problem/Recommendation - 2:  ·  Problem: Pneumonia due to COVID-19 virus:    -pt currently on NRM  -s/p Remdesivir, off of decadron  -care as per primary team.      Problem/Recommendation - 3:  ·  Problem: Hypothyroidism:  -c/w synthroid 50mcg   no

## 2023-03-22 NOTE — ED PROVIDER NOTE - CONSTITUTIONAL NOURISHMENT, MLM
No. HARRIETT screening performed.  STOP BANG Legend: 0-2 = LOW Risk; 3-4 = INTERMEDIATE Risk; 5-8 = HIGH Risk
THIN

## 2023-10-03 NOTE — ED ADULT NURSE REASSESSMENT NOTE - NS ED NURSE REASSESS COMMENT FT1
1600: pt belongings maintained in admitting department: clothing, purse and gold necklace placed in sterile urine cup. Emergency contact Brianna Honeycutt contacted multiple times for pick-up, unsuccessful at reaching Ms. Honeycutt. Belongings released from admitting to  home per Elif in admitting. 59

## 2025-01-12 NOTE — ED ADULT NURSE NOTE - NSFALLRSKASSESSTYPE_ED_ALL_ED
Patient was educated on the natural course of viral illness which typically lasts up to 10 days.  Conservative measures discussed including increased fluids, nasal saline irrigation (neti pot), warm steamy shower, salt water gargles, cough suppressants, expectorants (Mucinex), and analgesics (Tylenol and/or Ibuprofen). See your primary care provider if symptoms worsen or do not improve in 7 days. Seek emergency care if you develop fever over 104 or shortness of breath.    
Initial (On Arrival)